# Patient Record
Sex: FEMALE | Race: WHITE | NOT HISPANIC OR LATINO | Employment: FULL TIME | URBAN - METROPOLITAN AREA
[De-identification: names, ages, dates, MRNs, and addresses within clinical notes are randomized per-mention and may not be internally consistent; named-entity substitution may affect disease eponyms.]

---

## 2018-01-10 NOTE — MISCELLANEOUS
Message   Recorded as Task   Date: 04/15/2016 11:46 AM, Created By: Anna Ornelas   Task Name: Follow Up   Assigned To: oLyda Campbell   Regarding Patient: Madhuri Fernandez, Status: Active   Comment:    Anna Ornelas - 15 Apr 2016 11:46 AM     TASK CREATED  Please call patient about follow up appt  L/M FOR PATIENT      Active Problems    1  Abnormal electrocardiogram (794 31) (R94 31)   2  Difficulty breathing (786 09) (R06 89)   3  Hypertension (401 9) (I10)   4  Iron deficiency anemia (280 9) (D50 9)   5  Morbid or severe obesity due to excess calories (278 01) (E66 01)   6  Obesity (278 00) (E66 9)   7  Postgastrectomy malabsorption (579 3) (K91 2,Z90 3)   8  Pre-operative cardiovascular examination (V72 81) (Z01 810)   9  Status post gastric bypass for obesity (V45 86) (Z98 84)   10  Varicose Veins Of Lower Extremities (454 9)   11  Vitamin D deficiency (268 9) (E55 9)    Current Meds   1  Biotin 5 MG Oral Capsule; Therapy: (Recorded:99Wvb9968) to Recorded   2  Calcium 500 TABS; Therapy: (Recorded:64Msx9439) to Recorded   3  Daily Multiple Vitamins/Iron Oral Tablet; Therapy: (Recorded:51Dvs8109) to Recorded   4  Omeprazole 20 MG Oral Capsule Delayed Release; TAKE 1 CAPSULE DAILY; Therapy: [id number] to (Evaluate:99Bco0181)  Requested for: 76SZR1778; Last   Rx:[id number] Ordered    Allergies    1   No Known Drug Allergies    Signatures   Electronically signed by : Mell Holden, ; Apr 18 2016  9:38AM EST                       (Author)

## 2018-06-25 ENCOUNTER — TELEPHONE (OUTPATIENT)
Dept: BARIATRICS | Facility: CLINIC | Age: 43
End: 2018-06-25

## 2021-07-08 ENCOUNTER — HOSPITAL ENCOUNTER (INPATIENT)
Facility: HOSPITAL | Age: 46
LOS: 1 days | Discharge: HOME | DRG: 663 | End: 2021-07-09
Attending: EMERGENCY MEDICINE | Admitting: FAMILY MEDICINE
Payer: COMMERCIAL

## 2021-07-08 ENCOUNTER — TELEPHONE (OUTPATIENT)
Dept: FAMILY MEDICINE CLINIC | Facility: CLINIC | Age: 46
End: 2021-07-08

## 2021-07-08 ENCOUNTER — OFFICE VISIT (OUTPATIENT)
Dept: FAMILY MEDICINE CLINIC | Facility: CLINIC | Age: 46
End: 2021-07-08
Payer: COMMERCIAL

## 2021-07-08 ENCOUNTER — APPOINTMENT (OUTPATIENT)
Dept: LAB | Facility: HOSPITAL | Age: 46
DRG: 663 | End: 2021-07-08
Payer: COMMERCIAL

## 2021-07-08 VITALS
SYSTOLIC BLOOD PRESSURE: 133 MMHG | DIASTOLIC BLOOD PRESSURE: 84 MMHG | TEMPERATURE: 97.8 F | HEIGHT: 67 IN | HEART RATE: 96 BPM | WEIGHT: 208 LBS | RESPIRATION RATE: 15 BRPM | OXYGEN SATURATION: 95 % | BODY MASS INDEX: 32.65 KG/M2

## 2021-07-08 DIAGNOSIS — I83.93 VARICOSE VEINS OF BOTH LOWER EXTREMITIES, UNSPECIFIED WHETHER COMPLICATED: ICD-10-CM

## 2021-07-08 DIAGNOSIS — Z11.59 ENCOUNTER FOR HCV SCREENING TEST FOR LOW RISK PATIENT: ICD-10-CM

## 2021-07-08 DIAGNOSIS — D64.9 ANEMIA, UNSPECIFIED TYPE: Primary | ICD-10-CM

## 2021-07-08 DIAGNOSIS — Z12.31 ENCOUNTER FOR SCREENING MAMMOGRAM FOR MALIGNANT NEOPLASM OF BREAST: ICD-10-CM

## 2021-07-08 DIAGNOSIS — Z98.84 HISTORY OF GASTRIC BYPASS: ICD-10-CM

## 2021-07-08 DIAGNOSIS — R53.83 FATIGUE, UNSPECIFIED TYPE: ICD-10-CM

## 2021-07-08 DIAGNOSIS — Z00.00 HEALTHCARE MAINTENANCE: ICD-10-CM

## 2021-07-08 DIAGNOSIS — R53.83 FATIGUE, UNSPECIFIED TYPE: Primary | ICD-10-CM

## 2021-07-08 LAB
ABO GROUP BLD: NORMAL
ABO GROUP BLD: NORMAL
ALBUMIN SERPL BCP-MCNC: 3.5 G/DL (ref 3.5–5)
ALP SERPL-CCNC: 69 U/L (ref 46–116)
ALT SERPL W P-5'-P-CCNC: 16 U/L (ref 12–78)
ANION GAP SERPL CALCULATED.3IONS-SCNC: 7 MMOL/L (ref 4–13)
AST SERPL W P-5'-P-CCNC: 11 U/L (ref 5–45)
BASOPHILS # BLD AUTO: 0.04 THOUSANDS/ΜL (ref 0–0.1)
BASOPHILS # BLD AUTO: 0.04 THOUSANDS/ΜL (ref 0–0.1)
BASOPHILS NFR BLD AUTO: 1 % (ref 0–1)
BASOPHILS NFR BLD AUTO: 1 % (ref 0–1)
BILIRUB SERPL-MCNC: 0.5 MG/DL (ref 0.2–1)
BLD GP AB SCN SERPL QL: NEGATIVE
BUN SERPL-MCNC: 11 MG/DL (ref 5–25)
CALCIUM SERPL-MCNC: 8.6 MG/DL (ref 8.3–10.1)
CHLORIDE SERPL-SCNC: 107 MMOL/L (ref 100–108)
CHOLEST SERPL-MCNC: 121 MG/DL (ref 50–200)
CO2 SERPL-SCNC: 27 MMOL/L (ref 21–32)
CREAT SERPL-MCNC: 0.54 MG/DL (ref 0.6–1.3)
EOSINOPHIL # BLD AUTO: 0.05 THOUSAND/ΜL (ref 0–0.61)
EOSINOPHIL # BLD AUTO: 0.09 THOUSAND/ΜL (ref 0–0.61)
EOSINOPHIL NFR BLD AUTO: 1 % (ref 0–6)
EOSINOPHIL NFR BLD AUTO: 2 % (ref 0–6)
ERYTHROCYTE [DISTWIDTH] IN BLOOD BY AUTOMATED COUNT: 22.5 % (ref 11.6–15.1)
ERYTHROCYTE [DISTWIDTH] IN BLOOD BY AUTOMATED COUNT: 22.5 % (ref 11.6–15.1)
FERRITIN SERPL-MCNC: <1 NG/ML (ref 8–388)
FOLATE SERPL-MCNC: >20 NG/ML (ref 3.1–17.5)
GFR SERPL CREATININE-BSD FRML MDRD: 113 ML/MIN/1.73SQ M
GLUCOSE SERPL-MCNC: 100 MG/DL (ref 65–140)
HCG SERPL QL: NEGATIVE
HCT VFR BLD AUTO: 19.7 % (ref 34.8–46.1)
HCT VFR BLD AUTO: 20.2 % (ref 34.8–46.1)
HCV AB SER QL: NORMAL
HDLC SERPL-MCNC: 55 MG/DL
HGB BLD-MCNC: 4.4 G/DL (ref 11.5–15.4)
HGB BLD-MCNC: 4.6 G/DL (ref 11.5–15.4)
IMM GRANULOCYTES # BLD AUTO: 0.01 THOUSAND/UL (ref 0–0.2)
IMM GRANULOCYTES # BLD AUTO: 0.02 THOUSAND/UL (ref 0–0.2)
IMM GRANULOCYTES NFR BLD AUTO: 0 % (ref 0–2)
IMM GRANULOCYTES NFR BLD AUTO: 0 % (ref 0–2)
IRON SATN MFR SERPL: 2 %
IRON SERPL-MCNC: 9 UG/DL (ref 50–170)
LDLC SERPL CALC-MCNC: 59 MG/DL (ref 0–100)
LYMPHOCYTES # BLD AUTO: 1.11 THOUSANDS/ΜL (ref 0.6–4.47)
LYMPHOCYTES # BLD AUTO: 1.32 THOUSANDS/ΜL (ref 0.6–4.47)
LYMPHOCYTES NFR BLD AUTO: 24 % (ref 14–44)
LYMPHOCYTES NFR BLD AUTO: 32 % (ref 14–44)
MCH RBC QN AUTO: 12.8 PG (ref 26.8–34.3)
MCH RBC QN AUTO: 12.9 PG (ref 26.8–34.3)
MCHC RBC AUTO-ENTMCNC: 22.3 G/DL (ref 31.4–37.4)
MCHC RBC AUTO-ENTMCNC: 22.8 G/DL (ref 31.4–37.4)
MCV RBC AUTO: 57 FL (ref 82–98)
MCV RBC AUTO: 57 FL (ref 82–98)
MONOCYTES # BLD AUTO: 0.35 THOUSAND/ΜL (ref 0.17–1.22)
MONOCYTES # BLD AUTO: 0.39 THOUSAND/ΜL (ref 0.17–1.22)
MONOCYTES NFR BLD AUTO: 8 % (ref 4–12)
MONOCYTES NFR BLD AUTO: 9 % (ref 4–12)
NEUTROPHILS # BLD AUTO: 2.33 THOUSANDS/ΜL (ref 1.85–7.62)
NEUTROPHILS # BLD AUTO: 3.07 THOUSANDS/ΜL (ref 1.85–7.62)
NEUTS SEG NFR BLD AUTO: 56 % (ref 43–75)
NEUTS SEG NFR BLD AUTO: 66 % (ref 43–75)
NRBC BLD AUTO-RTO: 0 /100 WBCS
NRBC BLD AUTO-RTO: 0 /100 WBCS
PLATELET # BLD AUTO: 181 THOUSANDS/UL (ref 149–390)
PLATELET # BLD AUTO: 209 THOUSANDS/UL (ref 149–390)
POTASSIUM SERPL-SCNC: 4.5 MMOL/L (ref 3.5–5.3)
PROT SERPL-MCNC: 6.8 G/DL (ref 6.4–8.2)
RBC # BLD AUTO: 3.45 MILLION/UL (ref 3.81–5.12)
RBC # BLD AUTO: 3.56 MILLION/UL (ref 3.81–5.12)
RH BLD: POSITIVE
RH BLD: POSITIVE
SODIUM SERPL-SCNC: 141 MMOL/L (ref 136–145)
SPECIMEN EXPIRATION DATE: NORMAL
TIBC SERPL-MCNC: 496 UG/DL (ref 250–450)
TRIGL SERPL-MCNC: 36 MG/DL
TSH SERPL DL<=0.05 MIU/L-ACNC: 1.15 UIU/ML (ref 0.36–3.74)
VIT B12 SERPL-MCNC: 733 PG/ML (ref 100–900)
WBC # BLD AUTO: 4.14 THOUSAND/UL (ref 4.31–10.16)
WBC # BLD AUTO: 4.68 THOUSAND/UL (ref 4.31–10.16)

## 2021-07-08 PROCEDURE — 86900 BLOOD TYPING SEROLOGIC ABO: CPT | Performed by: EMERGENCY MEDICINE

## 2021-07-08 PROCEDURE — P9016 RBC LEUKOCYTES REDUCED: HCPCS

## 2021-07-08 PROCEDURE — 82746 ASSAY OF FOLIC ACID SERUM: CPT

## 2021-07-08 PROCEDURE — 86803 HEPATITIS C AB TEST: CPT

## 2021-07-08 PROCEDURE — 85025 COMPLETE CBC W/AUTO DIFF WBC: CPT

## 2021-07-08 PROCEDURE — 86901 BLOOD TYPING SEROLOGIC RH(D): CPT | Performed by: EMERGENCY MEDICINE

## 2021-07-08 PROCEDURE — 80061 LIPID PANEL: CPT

## 2021-07-08 PROCEDURE — 86920 COMPATIBILITY TEST SPIN: CPT

## 2021-07-08 PROCEDURE — 82607 VITAMIN B-12: CPT

## 2021-07-08 PROCEDURE — 80053 COMPREHEN METABOLIC PANEL: CPT

## 2021-07-08 PROCEDURE — 83550 IRON BINDING TEST: CPT

## 2021-07-08 PROCEDURE — 83036 HEMOGLOBIN GLYCOSYLATED A1C: CPT

## 2021-07-08 PROCEDURE — 85025 COMPLETE CBC W/AUTO DIFF WBC: CPT | Performed by: EMERGENCY MEDICINE

## 2021-07-08 PROCEDURE — 36430 TRANSFUSION BLD/BLD COMPNT: CPT

## 2021-07-08 PROCEDURE — 83540 ASSAY OF IRON: CPT

## 2021-07-08 PROCEDURE — 84703 CHORIONIC GONADOTROPIN ASSAY: CPT | Performed by: EMERGENCY MEDICINE

## 2021-07-08 PROCEDURE — 30233N1 TRANSFUSION OF NONAUTOLOGOUS RED BLOOD CELLS INTO PERIPHERAL VEIN, PERCUTANEOUS APPROACH: ICD-10-PCS | Performed by: FAMILY MEDICINE

## 2021-07-08 PROCEDURE — 36415 COLL VENOUS BLD VENIPUNCTURE: CPT

## 2021-07-08 PROCEDURE — 84443 ASSAY THYROID STIM HORMONE: CPT

## 2021-07-08 PROCEDURE — 1036F TOBACCO NON-USER: CPT | Performed by: NURSE PRACTITIONER

## 2021-07-08 PROCEDURE — 82728 ASSAY OF FERRITIN: CPT

## 2021-07-08 PROCEDURE — 99223 1ST HOSP IP/OBS HIGH 75: CPT | Performed by: FAMILY MEDICINE

## 2021-07-08 PROCEDURE — 86850 RBC ANTIBODY SCREEN: CPT | Performed by: EMERGENCY MEDICINE

## 2021-07-08 PROCEDURE — 3008F BODY MASS INDEX DOCD: CPT | Performed by: NURSE PRACTITIONER

## 2021-07-08 PROCEDURE — 99204 OFFICE O/P NEW MOD 45 MIN: CPT | Performed by: NURSE PRACTITIONER

## 2021-07-08 PROCEDURE — 99284 EMERGENCY DEPT VISIT MOD MDM: CPT | Performed by: EMERGENCY MEDICINE

## 2021-07-08 PROCEDURE — C9113 INJ PANTOPRAZOLE SODIUM, VIA: HCPCS | Performed by: FAMILY MEDICINE

## 2021-07-08 PROCEDURE — 99284 EMERGENCY DEPT VISIT MOD MDM: CPT

## 2021-07-08 RX ORDER — SODIUM CHLORIDE 9 MG/ML
100 INJECTION, SOLUTION INTRAVENOUS CONTINUOUS
Status: DISCONTINUED | OUTPATIENT
Start: 2021-07-08 | End: 2021-07-09

## 2021-07-08 RX ADMIN — SODIUM CHLORIDE 8 MG/HR: 9 INJECTION, SOLUTION INTRAVENOUS at 23:38

## 2021-07-08 RX ADMIN — SODIUM CHLORIDE 80 MG: 9 INJECTION, SOLUTION INTRAVENOUS at 23:08

## 2021-07-08 NOTE — PROGRESS NOTES
Assessment/Plan:    Fatigue  To obtain lab work now  Discussed concern about recurrence of anemia  Will call with results  To ER for any worsening of symptoms  Varicose veins of both lower extremities    To begin wearing compression stockings daily  Also discussed staying well hydrated in the avoidance of excessive sodium to help reduce swelling  Diagnoses and all orders for this visit:    Fatigue, unspecified type  -     CBC and differential; Future  -     Comprehensive metabolic panel; Future  -     TSH, 3rd generation with Free T4 reflex; Future  -     Iron Panel (Includes Ferritin, Iron Sat%, Iron, and TIBC); Future  -     Vitamin B12; Future  -     Folate; Future    BMI 33 0-33 9,adult  -     Lipid Panel with Direct LDL reflex; Future  -     Hemoglobin A1C; Future    Varicose veins of both lower extremities, unspecified whether complicated  -     Compression Stocking    Healthcare maintenance  -     Ambulatory referral to Gynecology; Future    History of gastric bypass  -     Vitamin B12; Future  -     Folate; Future    Encounter for screening mammogram for malignant neoplasm of breast  -     Mammo screening bilateral w 3d & cad; Future    Encounter for HCV screening test for low risk patient  -     Hepatitis C antibody; Future          Subjective:      Patient ID: Yaz Irizarry is a 50 y o  female  Ramana Andino presents for an initial visit  She was previously connected to another local practice but is transferring to be closer to home  Ramana Andino has a past medical history of severe anemia, and kidney stones  She has a past surgical history of gastric bypass in 2016,  cholecystectomy, hernia repair, and   In 2018, Ramana Andino was admitted to the hospital due to a critically low hemoglobin  She was given a blood transfusion  She has not had any additional lab work or healthcare since this time    She presents reporting fatigue and shortness of breath on exertion that have been progressively worsening for the past year  She has also been craving ice, having dizziness, and an elevated heart rate  Denies chest pain, or blood in stools  She has an upcoming appointment for colonoscopy scheduled  The following portions of the patient's history were reviewed and updated as appropriate:   She   Patient Active Problem List    Diagnosis Date Noted    Fatigue 07/08/2021    BMI 33 0-33 9,adult 07/08/2021    Varicose veins of both lower extremities 07/08/2021     No current outpatient medications on file  No current facility-administered medications for this visit  She is allergic to amoxicillin       Review of Systems   Constitutional: Positive for fatigue  HENT: Negative  Respiratory: Positive for shortness of breath (on exertion )  Cardiovascular: Positive for leg swelling  Negative for chest pain and palpitations  Elevated heart rate   Gastrointestinal:        Craving ice   Genitourinary:        LMP:7/2/21   Musculoskeletal: Negative  Skin: Negative  Neurological: Positive for dizziness  Psychiatric/Behavioral: Positive for sleep disturbance (chronic )  /84   Pulse 96   Temp 97 8 °F (36 6 °C)   Resp 15   Ht 5' 6 5" (1 689 m)   Wt 94 3 kg (208 lb)   LMP 07/02/2021   SpO2 95%   BMI 33 07 kg/m²     Objective:     Physical Exam  Vitals and nursing note reviewed  Constitutional:       General: She is not in acute distress  Appearance: She is well-developed  She is not ill-appearing, toxic-appearing or diaphoretic  Comments: Pale appearing   HENT:      Head: Normocephalic and atraumatic  Right Ear: Hearing, tympanic membrane and external ear normal       Left Ear: Hearing, tympanic membrane and external ear normal       Nose: Nose normal       Mouth/Throat:      Pharynx: Oropharynx is clear  Uvula midline  Eyes:      General: Lids are normal  Vision grossly intact  Gaze aligned appropriately        Extraocular Movements: Extraocular movements intact  Pupils: Pupils are equal, round, and reactive to light  Comments:  Presence of conjunctival pallor   Neck:      Thyroid: No thyromegaly  Cardiovascular:      Rate and Rhythm: Normal rate and regular rhythm  Heart sounds: Normal heart sounds  No murmur heard  Comments:  Presence of varicose veins bilaterally  Pulmonary:      Effort: Pulmonary effort is normal  No respiratory distress  Breath sounds: Normal breath sounds  No wheezing or rales  Chest:      Chest wall: No tenderness  Abdominal:      General: Bowel sounds are normal  There is no distension  Palpations: Abdomen is soft  There is no mass  Tenderness: There is no abdominal tenderness  There is no guarding or rebound  Musculoskeletal:         General: No tenderness or deformity  Normal range of motion  Cervical back: Normal range of motion and neck supple  Right lower leg: Edema (Trace) present  Left lower leg: Edema (Trace) present  Lymphadenopathy:      Cervical: No cervical adenopathy  Skin:     General: Skin is warm and dry  Coloration: Skin is not pale  Findings: No erythema or rash  Neurological:      General: No focal deficit present  Mental Status: She is alert and oriented to person, place, and time  Cranial Nerves: No cranial nerve deficit  Psychiatric:         Mood and Affect: Mood normal          Behavior: Behavior normal          Thought Content: Thought content normal          Judgment: Judgment normal              BMI Counseling: Body mass index is 33 07 kg/m²  The BMI is above normal  Nutrition recommendations include decreasing overall calorie intake, 3-5 servings of fruits/vegetables daily, reducing fast food intake, consuming healthier snacks, decreasing soda and/or juice intake, moderation in carbohydrate intake and increasing intake of lean protein  Exercise recommendations include exercising 3-5 times per week

## 2021-07-08 NOTE — H&P
[address] [facility]  H&P- Harish Cast 1975, 50 y o  female MRN: [id number]  Unit/Bed#: -01 Encounter: [id number]  Primary Care Provider: NOHEMY Cisse   Date and time admitted to hospital: 7/8/2021  4:50 PM    * Symptomatic anemia  Assessment & Plan  47-year-old lady with known history of gastric bypass surgery in 2013 presents with symptoms of increasing fatigue/exertional dyspnea/palpitations for almost 1 year  Patient reports feeling unwell in general   She did report in 2018 having similar symptoms at which point she received blood transfusion  No black tarry stools/bright red blood mixed with stools  She does report history of heavy vaginal bleeding during her periods although they are regular  No hematuria  No other active bleeding  She last saw a doctor in 2018  Denies any prior history of EGD/colonoscopy  · Today hemoglobin 4 6  No prior hemoglobin to compare with to get a baseline  · Hemodynamically stable with a blood pressure of 132/70/heart rate 79  · Stool occult blood x3 ordered  · Requested GI evaluation  · Patient receiving 3 unit packed RBC transfusion in total   · H&H Q 6 hours  · Started IV Protonix drip  · IV fluids  · Iron panel sent  (Patient does report a prior known history of iron deficiency anemia)    H/O gastric bypass  Assessment & Plan  · Not on any chronic multivitamins    Fatigue  Assessment & Plan  · Likely secondary to significant anemia  · TSH normal at 1 132  VTE Prophylaxis: Pharmacologic VTE Prophylaxis contraindicated due to gi  bleed  / sequential compression device   Code Status: level 1  POLST: POLST form is not discussed and not completed at this time  Anticipated Length of Stay:  Patient will be admitted on an Inpatient basis with an anticipated length of stay of  Over than 2 midnights     Justification for Hospital Stay: needs blood transfusion, GI bleed    Total Time for Visit, including Counseling / Coordination of Care: 45 minutes  Greater than 50% of this total time spent on direct patient counseling and coordination of care  Chief Complaint:   fatigue    History of Present Illness:    Dakota Vincent is a 50 y o  female with known history of gastric bypass surgery in  presents with symptoms of increased fatigue/exertional dyspnea/palpitations for almost 1 year  Patient reports feeling unwell in general   Reportedly patient had similar symptoms in 2018 when she did receive blood transfusion but did not follow-up with any doctor following that  Patient denies black tarry stools/bright red blood mixed with stools  She does report history of heavy vaginal bleeding during her periods although they are regular  No hematuria  No other active bleeding  No history of EGD/colonoscopy  Patient's hemoglobin 4 6 today  No prior hemoglobin to compare  Hemodynamically stable  Currently receiving 3 unit packed RBC transfusion  Being admitted for evaluation of severe symptomatic anemia and GI consult  Review of Systems:    Review of Systems   Constitutional: Negative for chills and fever  HENT: Negative for ear pain and sore throat  Eyes: Negative for pain and visual disturbance  Respiratory: Positive for shortness of breath  Negative for cough  Cardiovascular: Negative for chest pain and palpitations  Gastrointestinal: Negative for abdominal pain, blood in stool and vomiting  Genitourinary: Negative for dysuria and hematuria  Musculoskeletal: Negative for arthralgias and back pain  Skin: Negative for color change and rash  Neurological: Positive for weakness  Negative for seizures and syncope  All other systems reviewed and are negative        Past Medical and Surgical History:     Past Medical History:   Diagnosis Date    Anemia     Kidney stone        Past Surgical History:   Procedure Laterality Date     SECTION      CHOLECYSTECTOMY      GASTRIC BYPASS      HERNIA REPAIR Meds/Allergies:    Prior to Admission medications    Not on File     I have reviewed home medications with patient personally  Allergies: Allergies   Allergen Reactions    Amoxicillin GI Intolerance       Social History:     Marital Status: Single     Substance Use History:   Social History     Substance and Sexual Activity   Alcohol Use Never     Social History     Tobacco Use   Smoking Status Never Smoker   Smokeless Tobacco Never Used     Social History     Substance and Sexual Activity   Drug Use Never       Family History:    Family History   Problem Relation Age of Onset    Stroke Mother     Lung cancer Mother     Aortic aneurysm Mother     Hyperlipidemia Mother     Emphysema Mother     Heart disease Father     Hypertension Father     Diabetes Father     No Known Problems Brother     No Known Problems Daughter     Asthma Maternal Grandmother     Pancreatic cancer Maternal Grandfather     Diabetes Paternal Grandmother     Dementia Paternal Grandmother     Hypertension Paternal Grandmother     Coronary artery disease Paternal Grandfather     No Known Problems Brother        Physical Exam:     Vitals:   Blood Pressure: 125/83 (07/08/21 1941)  Pulse: 86 (07/08/21 1941)  Temperature: 98 3 °F (36 8 °C) (07/08/21 1941)  Temp Source: Oral (07/08/21 1915)  Respirations: 18 (07/08/21 1941)  Height: 5' 6" (167 6 cm) (07/08/21 1652)  Weight - Scale: 94 3 kg (208 lb) (07/08/21 1652)  SpO2: 99 % (07/08/21 1941)    Physical Exam  Vitals reviewed  Constitutional:       General: She is not in acute distress  Appearance: She is normal weight  She is ill-appearing  HENT:      Head: Normocephalic and atraumatic  Nose: Nose normal  No congestion  Mouth/Throat:      Mouth: Mucous membranes are dry  Pharynx: Oropharynx is clear  Eyes:      Pupils: Pupils are equal, round, and reactive to light        Comments: Pallor +   Cardiovascular:      Rate and Rhythm: Normal rate and regular rhythm  Pulses: Normal pulses  Pulmonary:      Effort: Pulmonary effort is normal  No respiratory distress  Breath sounds: Normal breath sounds  No wheezing or rales  Abdominal:      General: Abdomen is flat  Bowel sounds are normal  There is no distension  Palpations: Abdomen is soft  Tenderness: There is no abdominal tenderness  There is no guarding  Musculoskeletal:         General: No swelling  Normal range of motion  Cervical back: Normal range of motion and neck supple  Skin:     General: Skin is warm and dry  Findings: No rash  Neurological:      General: No focal deficit present  Mental Status: She is alert and oriented to person, place, and time  Psychiatric:         Mood and Affect: Mood normal          Behavior: Behavior normal          Additional Data:     Lab Results: I have personally reviewed pertinent reports  Results from last 7 days   Lab Units 07/08/21  1724   WBC Thousand/uL 4 68   HEMOGLOBIN g/dL 4 6*   HEMATOCRIT % 20 2*   PLATELETS Thousands/uL 209   NEUTROS PCT % 66   LYMPHS PCT % 24   MONOS PCT % 8   EOS PCT % 1     Results from last 7 days   Lab Units 07/08/21  1231   SODIUM mmol/L 141   POTASSIUM mmol/L 4 5   CHLORIDE mmol/L 107   CO2 mmol/L 27   BUN mg/dL 11   CREATININE mg/dL 0 54*   ANION GAP mmol/L 7   CALCIUM mg/dL 8 6   ALBUMIN g/dL 3 5   TOTAL BILIRUBIN mg/dL 0 50   ALK PHOS U/L 69   ALT U/L 16   AST U/L 11   GLUCOSE RANDOM mg/dL 100                       Imaging: I have personally reviewed pertinent reports  No orders to display       AllscriObjectVideo / Epic Records Reviewed: No     ** Please Note: This note has been constructed using a voice recognition system   **

## 2021-07-08 NOTE — TELEPHONE ENCOUNTER
T/c to patient again at [phone]  Reviewed critically low hemoglobin with patient  Advised patient to go to the ER now, stressed importance  Patient verbalized agreement and understanding of plan of care

## 2021-07-08 NOTE — ASSESSMENT & PLAN NOTE
28-year-old lady with known history of gastric bypass surgery in 2013 presents with symptoms of increasing fatigue/exertional dyspnea/palpitations for almost 1 year  Patient reports feeling unwell in general   She did report in 2018 having similar symptoms at which point she received blood transfusion  No black tarry stools/bright red blood mixed with stools  She does report history of heavy vaginal bleeding during her periods although they are regular  No hematuria  No other active bleeding  She last saw a doctor in 2018  Denies any prior history of EGD/colonoscopy  · Today hemoglobin 4 6  No prior hemoglobin to compare with to get a baseline  · Hemodynamically stable with a blood pressure of 132/70/heart rate 79  · Stool occult blood x3 ordered  · Requested GI evaluation  · Patient receiving 3 unit packed RBC transfusion in total   · H&H Q 6 hours  · Started IV Protonix drip  · IV fluids  · Iron panel sent    (Patient does report a prior known history of iron deficiency anemia)

## 2021-07-08 NOTE — PATIENT INSTRUCTIONS
Weight Management   AMBULATORY CARE:   Why it is important to manage your weight:  Being overweight increases your risk of health conditions such as heart disease, high blood pressure, type 2 diabetes, and certain types of cancer  It can also increase your risk for osteoarthritis, sleep apnea, and other respiratory problems  Aim for a slow, steady weight loss  Even a small amount of weight loss can lower your risk of health problems  How to lose weight safely:  A safe and healthy way to lose weight is to eat fewer calories and get regular exercise  · You can lose up about 1 pound a week by decreasing the number of calories you eat by 500 calories each day  You can decrease calories by eating smaller portion sizes or by cutting out high-calorie foods  Read labels to find out how many calories are in the foods you eat  · You can also burn calories with exercise such as walking, swimming, or biking  You will be more likely to keep weight off if you make these changes part of your lifestyle  Exercise at least 30 minutes per day on most days of the week  You can also fit in more physical activity by taking the stairs instead of the elevator or parking farther away from stores  Ask your healthcare provider about the best exercise plan for you  Healthy meal plan for weight management:  A healthy meal plan includes a variety of foods, contains fewer calories, and helps you stay healthy  A healthy meal plan includes the following:     · Eat whole-grain foods more often  A healthy meal plan should contain fiber  Fiber is the part of grains, fruits, and vegetables that is not broken down by your body  Whole-grain foods are healthy and provide extra fiber in your diet  Some examples of whole-grain foods are whole-wheat breads and pastas, oatmeal, brown rice, and bulgur  · Eat a variety of vegetables every day  Include dark, leafy greens such as spinach, kale, giovanna greens, and mustard greens   Eat yellow and orange vegetables such as carrots, sweet potatoes, and winter squash  · Eat a variety of fruits every day  Choose fresh or canned fruit (canned in its own juice or light syrup) instead of juice  Fruit juice has very little or no fiber  · Eat low-fat dairy foods  Drink fat-free (skim) milk or 1% milk  Eat fat-free yogurt and low-fat cottage cheese  Try low-fat cheeses such as mozzarella and other reduced-fat cheeses  · Choose meat and other protein foods that are low in fat  Choose beans or other legumes such as split peas or lentils  Choose fish, skinless poultry (chicken or turkey), or lean cuts of red meat (beef or pork)  Before you cook meat or poultry, cut off any visible fat  · Use less fat and oil  Try baking foods instead of frying them  Add less fat, such as margarine, sour cream, regular salad dressing and mayonnaise to foods  Eat fewer high-fat foods  Some examples of high-fat foods include french fries, doughnuts, ice cream, and cakes  · Eat fewer sweets  Limit foods and drinks that are high in sugar  This includes candy, cookies, regular soda, and sweetened drinks  Ways to decrease calories:   · Eat smaller portions  ? Use a small plate with smaller servings  ? Do not eat second helpings  ? When you eat at a restaurant, ask for a box and place half of your meal in the box before you eat  ? Share an entrée with someone else  · Replace high-calorie snacks with healthy, low-calorie snacks  ? Choose fresh fruit, vegetables, fat-free rice cakes, or air-popped popcorn instead of potato chips, nuts, or chocolate  ? Choose water or calorie-free drinks instead of soda or sweetened drinks  · Do not shop for groceries when you are hungry  You may be more likely to make unhealthy food choices  Take a grocery list of healthy foods and shop after you have eaten  · Eat regular meals  Do not skip meals  Skipping meals can lead to overeating later in the day   This can make it harder for you to lose weight  Eat a healthy snack in place of a meal if you do not have time to eat a regular meal  Talk with a dietitian to help you create a meal plan and schedule that is right for you  Other things to consider as you try to lose weight:   · Be aware of situations that may give you the urge to overeat, such as eating while watching television  Find ways to avoid these situations  For example, read a book, go for a walk, or do crafts  · Meet with a weight loss support group or friends who are also trying to lose weight  This may help you stay motivated to continue working on your weight loss goals  © Copyright [address] Information is for End User's use only and may not be sold, redistributed or otherwise used for commercial purposes  All illustrations and images included in CareNotes® are the copyrighted property of A D A M , Inc  or [facility] Lamin Tucker   The above information is an  only  It is not intended as medical advice for individual conditions or treatments  Talk to your doctor, nurse or pharmacist before following any medical regimen to see if it is safe and effective for you  Heart Healthy Diet   AMBULATORY CARE:   A heart healthy diet  is an eating plan low in unhealthy fats and sodium (salt)  The plan is high in healthy fats and fiber  A heart healthy diet helps improve your cholesterol levels and lowers your risk for heart disease and stroke  A dietitian will teach you how to read and understand food labels  Heart healthy diet guidelines to follow:   · Choose foods that contain healthy fats  ? Unsaturated fats  include monounsaturated and polyunsaturated fats  Unsaturated fat is found in foods such as soybean, canola, olive, corn, and safflower oils  It is also found in soft tub margarine that is made with liquid vegetable oil  ? Omega-3 fat  is found in certain fish, such as salmon, tuna, and trout, and in walnuts and flaxseed  Eat fish high in omega-3 fats at least 2 times a week  · Get 20 to 30 grams of fiber each day  Fruits, vegetables, whole-grain foods, and legumes (cooked beans) are good sources of fiber  · Limit or do not have unhealthy fats  ? Cholesterol  is found in animal foods, such as eggs and lobster, and in dairy products made from whole milk  Limit cholesterol to less than 200 mg each day  ? Saturated fat  is found in meats, such as armijo and hamburger  It is also found in chicken or turkey skin, whole milk, and butter  Limit saturated fat to less than 7% of your total daily calories  ? Trans fat  is found in packaged foods, such as potato chips and cookies  It is also in hard margarine, some fried foods, and shortening  Do not eat foods that contain trans fats  · Limit sodium as directed  You may be told to limit sodium to 2,000 to 2,300 mg each day  Choose low-sodium or no-salt-added foods  Add little or no salt to food you prepare  Use herbs and spices in place of salt  Include the following in your heart healthy plan:  Ask your dietitian or healthcare provider how many servings to have from each of the following food groups:  · Grains:      ? Whole-wheat breads, cereals, and pastas, and brown rice    ? Low-fat, low-sodium crackers and chips    · Vegetables:      ? Broccoli, green beans, green peas, and spinach    ? Collards, kale, and lima beans    ? Carrots, sweet potatoes, tomatoes, and peppers    ? Canned vegetables with no salt added    · Fruits:      ? Bananas, peaches, pears, and pineapple    ? Grapes, raisins, and dates    ? Oranges, tangerines, grapefruit, orange juice, and grapefruit juice    ? Apricots, mangoes, melons, and papaya    ? Raspberries and strawberries    ? Canned fruit with no added sugar    · Low-fat dairy:      ? Nonfat (skim) milk, 1% milk, and low-fat almond, cashew, or soy milks fortified with calcium    ?  Low-fat cheese, regular or frozen yogurt, and cottage cheese    · Meats and proteins:      ? Lean cuts of beef and pork (loin, leg, round), skinless chicken and turkey    ? Legumes, soy products, egg whites, or nuts    Limit or do not include the following in your heart healthy plan:   · Unhealthy fats and oils:      ? Whole or 2% milk, cream cheese, sour cream, or cheese    ? High-fat cuts of beef (T-bone steaks, ribs), chicken or turkey with skin, and organ meats such as liver    ? Butter, stick margarine, shortening, and cooking oils such as coconut or palm oil    · Foods and liquids high in sodium:      ? Packaged foods, such as frozen dinners, cookies, macaroni and cheese, and cereals with more than 300 mg of sodium per serving    ? Vegetables with added sodium, such as instant potatoes, vegetables with added sauces, or regular canned vegetables    ? Cured or smoked meats, such as hot dogs, armijo, and sausage    ? High-sodium ketchup, barbecue sauce, salad dressing, pickles, olives, soy sauce, or miso    · Foods and liquids high in sugar:      ? Candy, cake, cookies, pies, or doughnuts    ? Soft drinks (soda), sports drinks, or sweetened tea    ? Canned or dry mixes for cakes, soups, sauces, or gravies    Other healthy heart guidelines:   · Do not smoke  Nicotine and other chemicals in cigarettes and cigars can cause lung and heart damage  Ask your healthcare provider for information if you currently smoke and need help to quit  E-cigarettes or smokeless tobacco still contain nicotine  Talk to your healthcare provider before you use these products  · Limit or do not drink alcohol as directed  Alcohol can damage your heart and raise your blood pressure  Your healthcare provider may give you specific daily and weekly limits  The general recommended limit is 1 drink a day for women 21 or older and for men 72 or older  Do not have more than 3 drinks in a day or 7 in a week  The recommended limit is 2 drinks a day for men 24to 59years of age   Do not have more than 4 drinks in a day or 14 in a week  A drink of alcohol is 12 ounces of beer, 5 ounces of wine, or 1½ ounces of liquor  · Exercise regularly  Exercise can help you maintain a healthy weight and improve your blood pressure and cholesterol levels  Regular exercise can also decrease your risk for heart problems  Ask your healthcare provider about the best exercise plan for you  Do not start an exercise program without asking your healthcare provider  Follow up with your doctor or cardiologist as directed:  Write down your questions so you remember to ask them during your visits  © Copyright [facility] [address] Information is for End User's use only and may not be sold, redistributed or otherwise used for commercial purposes  All illustrations and images included in CareNotes® are the copyrighted property of [employer]  or [facility] Lamin Tucker   The above information is an  only  It is not intended as medical advice for individual conditions or treatments  Talk to your doctor, nurse or pharmacist before following any medical regimen to see if it is safe and effective for you

## 2021-07-08 NOTE — TELEPHONE ENCOUNTER
T/c to patient at [phone], number no longer accepts calls  T/c to her emergency contact, her mother Benito  Gave me an additional number at [phone]  L/M stressing the importance of returning out call due to abnormal findings  Awaiting call back

## 2021-07-08 NOTE — ASSESSMENT & PLAN NOTE
To begin wearing compression stockings daily  Also discussed staying well hydrated in the avoidance of excessive sodium to help reduce swelling

## 2021-07-08 NOTE — ASSESSMENT & PLAN NOTE
To obtain lab work now  Discussed concern about recurrence of anemia  Will call with results  To ER for any worsening of symptoms

## 2021-07-08 NOTE — ED PROVIDER NOTES
Pt Name: Leetta Apley  MRN: [id number]  [place] 1975  Age/Sex: 50 y o  female  Date of evaluation: 2021  PCP: Joceline Mckeon, [address] [place] [address]    Chief Complaint   Patient presents with    Abnormal Lab     Patient states that her HGB today was 4 4         HPI    50 y o  female presenting with symptomatic anemia  Patient states she has a history of anemia, has required blood transfusions in the past but has not seen her family doctor in 2 years, established a new family doctor recently  Had blood work done today and was told her hemoglobin was 4 4  She states she has been having fatigue, shortness of breath, lightheadedness  Denies any pain, denies any blood in her stool, denies any hematemesis  Does states her periods are heavier  Just finished her menstrual period today            Past Medical and Surgical History    Past Medical History:   Diagnosis Date    Anemia     Kidney stone        Past Surgical History:   Procedure Laterality Date     SECTION      CHOLECYSTECTOMY      GASTRIC BYPASS      HERNIA REPAIR         Family History   Problem Relation Age of Onset    Stroke Mother     Lung cancer Mother     Aortic aneurysm Mother     Hyperlipidemia Mother     Emphysema Mother     Heart disease Father     Hypertension Father     Diabetes Father     No Known Problems Brother     No Known Problems Daughter     Asthma Maternal Grandmother     Pancreatic cancer Maternal Grandfather     Diabetes Paternal Grandmother     Dementia Paternal Grandmother     Hypertension Paternal Grandmother     Coronary artery disease Paternal Grandfather     No Known Problems Brother        Social History     Tobacco Use    Smoking status: Never Smoker    Smokeless tobacco: Never Used   Vaping Use    Vaping Use: Never used   Substance Use Topics    Alcohol use: Never    Drug use: Never           Allergies    Allergies   Allergen Reactions    Amoxicillin GI Intolerance       Home Medications    Prior to Admission medications    Not on File           Review of Systems    Review of Systems   Constitutional: Positive for fatigue  Negative for chills and fever  HENT: Negative for rhinorrhea and sore throat  Eyes: Negative for pain and visual disturbance  Respiratory: Positive for shortness of breath  Negative for cough  Cardiovascular: Negative for chest pain and leg swelling  Gastrointestinal: Negative for abdominal pain, nausea and vomiting  Genitourinary: Negative for dysuria and hematuria  Musculoskeletal: Negative for back pain and myalgias  Skin: Positive for pallor  Negative for rash and wound  Neurological: Positive for light-headedness  Negative for syncope and headaches  Physical Exam      ED Triage Vitals   Temperature Pulse Respirations Blood Pressure SpO2   07/08/21 1652 07/08/21 1652 07/08/21 1652 07/08/21 1652 07/08/21 1652   97 8 °F (36 6 °C) (!) 107 22 148/72 100 %      Temp Source Heart Rate Source Patient Position - Orthostatic VS BP Location FiO2 (%)   07/08/21 1652 07/08/21 1652 07/08/21 1652 07/08/21 1652 --   Oral Monitor Lying Right arm       Pain Score       07/08/21 2210       No Pain               Physical Exam  Constitutional:       General: She is not in acute distress  Appearance: She is not toxic-appearing  HENT:      Head: Normocephalic and atraumatic  Nose: Nose normal       Mouth/Throat:      Mouth: Mucous membranes are moist    Eyes:      Extraocular Movements: Extraocular movements intact  Pupils: Pupils are equal, round, and reactive to light  Comments: Conjunctival pallor   Cardiovascular:      Rate and Rhythm: Regular rhythm  Tachycardia present  Pulmonary:      Effort: No respiratory distress  Breath sounds: Normal breath sounds  No wheezing  Abdominal:      General: Abdomen is flat  There is no distension  Tenderness: There is no abdominal tenderness     Musculoskeletal:         General: No swelling or deformity  Normal range of motion  Cervical back: Normal range of motion and neck supple  Skin:     General: Skin is warm  Coloration: Skin is pale  Findings: No erythema  Neurological:      Mental Status: She is alert and oriented to person, place, and time  Mental status is at baseline  Diagnostic Results      Labs:    Results Reviewed     Procedure Component Value Units Date/Time    hCG, qualitative pregnancy [[id number]]  (Normal) Collected: 07/08/21 1724    Lab Status: Final result Specimen: Blood from Arm, Right Updated: 07/08/21 1750     Preg, Serum Negative    CBC and differential [[id number]]  (Abnormal) Collected: 07/08/21 1724    Lab Status: Final result Specimen: Blood from Arm, Right Updated: 07/08/21 1748     WBC 4 68 Thousand/uL      RBC 3 56 Million/uL      Hemoglobin 4 6 g/dL      Hematocrit 20 2 %      MCV 57 fL      MCH 12 9 pg      MCHC 22 8 g/dL      RDW 22 5 %      Platelets 931 Thousands/uL      nRBC 0 /100 WBCs      Neutrophils Relative 66 %      Immat GRANS % 0 %      Lymphocytes Relative 24 %      Monocytes Relative 8 %      Eosinophils Relative 1 %      Basophils Relative 1 %      Neutrophils Absolute 3 07 Thousands/µL      Immature Grans Absolute 0 02 Thousand/uL      Lymphocytes Absolute 1 11 Thousands/µL      Monocytes Absolute 0 39 Thousand/µL      Eosinophils Absolute 0 05 Thousand/µL      Basophils Absolute 0 04 Thousands/µL           All labs reviewed and utilized in the medical decision making process    Radiology:    No orders to display       All radiology studies independently viewed by me and interpreted by the radiologist     Procedure    Procedures        MDM    Patient presenting with significant anemia, tachycardic and symptomatic as described above  Likely not acute anemia, suspicious for chronic anemia, possible iron deficiency anemia  Not taking vitamins/supplementals in the setting of gastric bypass    Offered rectal examination for Hemoccult testing, however patient refused  She denies any GI bleeding  After consent obtained for blood transfusion, 3 units of PRBCs were ordered, discussed with internal medicine for hospitalization  Medications   sodium chloride 0 9 % infusion (100 mL/hr Intravenous New Bag 7/9/21 0528)   pantoprazole (PROTONIX) 80 mg in sodium chloride 0 9 % 100 mL IVPB (0 mg Intravenous Stopped 7/9/21 0559)     And   pantoprazole (PROTONIX) 80 mg in sodium chloride 0 9 % 100 mL infusion (8 mg/hr Intravenous New Bag 7/9/21 0945)   iron sucrose (VENOFER) 200 mg in sodium chloride 0 9 % 100 mL IVPB (200 mg Intravenous New Bag 7/9/21 0945)           FINAL IMPRESSION    Final diagnoses:   Anemia, unspecified type         DISPOSITION    Time reflects when diagnosis was documented in both MDM as applicable and the Disposition within this note     Time User Action Codes Description Comment    7/8/2021  7:06 PM Tera Lane Add [D64 9] Anemia, unspecified type     7/8/2021  7:17 PM Mitra Warren Add [R53 83] Fatigue, unspecified type       ED Disposition     ED Disposition Condition Date/Time Comment    Admit Stable Thu Jul 8, 2021  7:06 PM Case was discussed with Dr Jose Donnelly and the patient's admission status was agreed to be Admission Status: inpatient status to the service of Dr Jose Donnelly   Follow-up Information     Follow up With Specialties Details Why Contact Info    Maryam Larsen, [facility], Nurse Practitioner Schedule an appointment as soon as possible for a visit in 1 week(s)  [address] [place] [address]  [phone]              PATIENT REFERRED TO:    Maryam Larsen, [address]  [address] [place] [address]  [phone]    Schedule an appointment as soon as possible for a visit in 1 week(s)        DISCHARGE MEDICATIONS:    There are no discharge medications for this patient  No discharge procedures on file           Miguel A Litten, DO        This note was partially completed using voice recognition technology, and was scanned for gross errors; however some errors may still exist  Please contact the author with any questions or requests for clarification        Ellen Bird DO  07/09/21 0227

## 2021-07-09 VITALS
HEIGHT: 66 IN | OXYGEN SATURATION: 100 % | TEMPERATURE: 98.2 F | WEIGHT: 208 LBS | SYSTOLIC BLOOD PRESSURE: 140 MMHG | DIASTOLIC BLOOD PRESSURE: 83 MMHG | RESPIRATION RATE: 17 BRPM | HEART RATE: 73 BPM | BODY MASS INDEX: 33.43 KG/M2

## 2021-07-09 LAB
ABO GROUP BLD BPU: NORMAL
ANION GAP SERPL CALCULATED.3IONS-SCNC: 10 MMOL/L (ref 4–13)
BPU ID: NORMAL
BUN SERPL-MCNC: 7 MG/DL (ref 5–25)
CALCIUM SERPL-MCNC: 8.8 MG/DL (ref 8.3–10.1)
CHLORIDE SERPL-SCNC: 106 MMOL/L (ref 100–108)
CO2 SERPL-SCNC: 24 MMOL/L (ref 21–32)
CREAT SERPL-MCNC: 0.58 MG/DL (ref 0.6–1.3)
CROSSMATCH: NORMAL
ERYTHROCYTE [DISTWIDTH] IN BLOOD BY AUTOMATED COUNT: 31.2 % (ref 11.6–15.1)
FERRITIN SERPL-MCNC: 2 NG/ML (ref 8–388)
GFR SERPL CREATININE-BSD FRML MDRD: 110 ML/MIN/1.73SQ M
GLUCOSE SERPL-MCNC: 93 MG/DL (ref 65–140)
HCT VFR BLD AUTO: 27.5 % (ref 34.8–46.1)
HEMOCCULT STL QL: NEGATIVE
HEMOCCULT STL QL: NORMAL
HEMOCCULT STL QL: NORMAL
HGB BLD-MCNC: 7.2 G/DL (ref 11.5–15.4)
IRON SATN MFR SERPL: 5 %
IRON SERPL-MCNC: 25 UG/DL (ref 50–170)
MCH RBC QN AUTO: 17 PG (ref 26.8–34.3)
MCHC RBC AUTO-ENTMCNC: 26.2 G/DL (ref 31.4–37.4)
MCV RBC AUTO: 65 FL (ref 82–98)
PLATELET # BLD AUTO: 181 THOUSANDS/UL (ref 149–390)
POTASSIUM SERPL-SCNC: 4 MMOL/L (ref 3.5–5.3)
RBC # BLD AUTO: 4.23 MILLION/UL (ref 3.81–5.12)
SODIUM SERPL-SCNC: 140 MMOL/L (ref 136–145)
TIBC SERPL-MCNC: 461 UG/DL (ref 250–450)
UNIT DISPENSE STATUS: NORMAL
UNIT PRODUCT CODE: NORMAL
UNIT RH: NORMAL
WBC # BLD AUTO: 5.94 THOUSAND/UL (ref 4.31–10.16)

## 2021-07-09 PROCEDURE — 83550 IRON BINDING TEST: CPT | Performed by: INTERNAL MEDICINE

## 2021-07-09 PROCEDURE — 83540 ASSAY OF IRON: CPT | Performed by: INTERNAL MEDICINE

## 2021-07-09 PROCEDURE — C9113 INJ PANTOPRAZOLE SODIUM, VIA: HCPCS | Performed by: FAMILY MEDICINE

## 2021-07-09 PROCEDURE — 99222 1ST HOSP IP/OBS MODERATE 55: CPT | Performed by: PHYSICIAN ASSISTANT

## 2021-07-09 PROCEDURE — 99232 SBSQ HOSP IP/OBS MODERATE 35: CPT | Performed by: INTERNAL MEDICINE

## 2021-07-09 PROCEDURE — 80048 BASIC METABOLIC PNL TOTAL CA: CPT | Performed by: INTERNAL MEDICINE

## 2021-07-09 PROCEDURE — P9016 RBC LEUKOCYTES REDUCED: HCPCS

## 2021-07-09 PROCEDURE — 82272 OCCULT BLD FECES 1-3 TESTS: CPT | Performed by: FAMILY MEDICINE

## 2021-07-09 PROCEDURE — 82728 ASSAY OF FERRITIN: CPT | Performed by: INTERNAL MEDICINE

## 2021-07-09 PROCEDURE — 85027 COMPLETE CBC AUTOMATED: CPT | Performed by: INTERNAL MEDICINE

## 2021-07-09 RX ORDER — QUINIDINE GLUCONATE 324 MG
240 TABLET, EXTENDED RELEASE ORAL
Qty: 90 TABLET | Refills: 0 | Status: SHIPPED | OUTPATIENT
Start: 2021-07-09 | End: 2021-08-09 | Stop reason: SDUPTHER

## 2021-07-09 RX ADMIN — IRON SUCROSE 200 MG: 20 INJECTION, SOLUTION INTRAVENOUS at 09:45

## 2021-07-09 RX ADMIN — SODIUM CHLORIDE 100 ML/HR: 0.9 INJECTION, SOLUTION INTRAVENOUS at 05:28

## 2021-07-09 RX ADMIN — SODIUM CHLORIDE 8 MG/HR: 9 INJECTION, SOLUTION INTRAVENOUS at 09:45

## 2021-07-09 NOTE — UTILIZATION REVIEW
Inpatient Admission Authorization Request   NOTIFICATION OF INPATIENT ADMISSION/INPATIENT AUTHORIZATION REQUEST   SERVICING FACILITY:   [address] [place] [address]  Tax ID: [id number]  NPI: [id number]  Place of Service: Inpatient [facility]  [address]  Place of Service Code: 24     ATTENDING PROVIDER:  Attending Name and NPI#: Teresa Gamez [[id number]]  Address: [address] [place] [address]  Phone: [phone]     UTILIZATION REVIEW CONTACT:  Sana Jeffers, Utilization   Network Utilization Review Department  Phone: [phone]  Fax [phone]  Email: [email or web address]     PHYSICIAN ADVISORY SERVICES:  FOR [facility] REVIEW - MEDICAL NECESSITY DENIAL  Phone: [phone]  Fax: [phone]  Email: [email or web address]     TYPE OF REQUEST:  Inpatient Status     ADMISSION INFORMATION:  ADMISSION DATE/TIME: 7/8/21  7:07 PM  PATIENT DIAGNOSIS CODE/DESCRIPTION:  Abnormal laboratory test result [R89 9]  Fatigue, unspecified type [R53 83]  Anemia, unspecified type [D64 9]  DISCHARGE DATE/TIME: No discharge date for patient encounter  DISCHARGE DISPOSITION (IF DISCHARGED): Final discharge disposition not confirmed     IMPORTANT INFORMATION:  Please contact the Sana Jeffers directly with any questions or concerns regarding this request  Department voicemails are confidential     Send requests for admission clinical reviews, concurrent reviews, approvals, and administrative denials due to lack of clinical to fax [phone]

## 2021-07-09 NOTE — ASSESSMENT & PLAN NOTE
78-year-old lady with known history of gastric bypass surgery in 2013 presents with symptoms of increasing fatigue/exertional dyspnea/palpitations for almost 1 year  · Today hemoglobin 4 6    No prior hemoglobin to compare with to get a baseline  · Secondary to severe iron deficiency anemia  · Given low MCV suspect very slow obscure bleeding  · Hemodynamically stable  · Received 3 units packed red blood cells  · Follow-up hemoglobin pending  · Will place on IV event for  · Follow-up GI consult for possible EGD  · Continue NPO for now

## 2021-07-09 NOTE — PROGRESS NOTES
[facility]  Progress Note Bart Francis 1975, 50 y o  female MRN: [id number]  Unit/Bed#: -01 Encounter: [id number]  Primary Care Provider: NOHEMY Byers   Date and time admitted to hospital: 2021  4:50 PM    H/O gastric bypass  Assessment & Plan  · Not on any chronic multivitamins    Fatigue  Assessment & Plan  · Likely secondary to significant anemia      * Symptomatic anemia  Assessment & Plan  53-year-old lady with known history of gastric bypass surgery in  presents with symptoms of increasing fatigue/exertional dyspnea/palpitations for almost 1 year  · Today hemoglobin 4 6  No prior hemoglobin to compare with to get a baseline  · Secondary to severe iron deficiency anemia  · Given low MCV suspect very slow obscure bleeding  · Hemodynamically stable  · Received 3 units packed red blood cells  · Follow-up hemoglobin pending  · Will place on IV event for  · Follow-up GI consult for possible EGD  · Continue NPO for now      VTE Pharmacologic Prophylaxis:   Pharmacologic: Pharmacologic VTE Prophylaxis contraindicated due to Acute anemia  Mechanical VTE Prophylaxis in Place: Yes    Patient Centered Rounds: I have performed bedside rounds with nursing staff today  Discussions with Specialists or Other Care Team Provider:  Cm, nursing    Education and Discussions with Family / Patient: pt    Time Spent for Care: 30 minutes  More than 50% of total time spent on counseling and coordination of care as described above      Current Length of Stay: 1 day(s)    Current Patient Status: Inpatient   Certification Statement: The patient will continue to require additional inpatient hospital stay due to Anemia requiring further evaluation    Discharge Plan:  See above    Code Status: Level 1 - Full Code      Subjective:   No Acute complaints    Objective:     Vitals:   Temp (24hrs), Av 4 °F (36 9 °C), Min:97 8 °F (36 6 °C), Max:99 °F (37 2 °C)    Temp:  [97 8 °F (36 6 °C)-99 °F (37 2 °C)] 98 2 °F (36 8 °C)  HR:  [] 73  Resp:  [14-22] 17  BP: (125-148)/(63-84) 140/83  SpO2:  [95 %-100 %] 100 %  Body mass index is 33 57 kg/m²  Input and Output Summary (last 24 hours): Intake/Output Summary (Last 24 hours) at 7/9/2021 0901  Last data filed at 7/9/2021 0800  Gross per 24 hour   Intake 1050 ml   Output --   Net 1050 ml       Physical Exam:     Physical Exam  Constitutional:       General: She is not in acute distress  Appearance: She is well-developed  She is not diaphoretic  HENT:      Head: Normocephalic and atraumatic  Nose: Nose normal       Mouth/Throat:      Pharynx: No oropharyngeal exudate  Eyes:      General: No scleral icterus  Right eye: No discharge  Left eye: No discharge  Conjunctiva/sclera: Conjunctivae normal    Neck:      Thyroid: No thyromegaly  Vascular: No JVD  Cardiovascular:      Rate and Rhythm: Normal rate and regular rhythm  Heart sounds: Normal heart sounds  No murmur heard  No friction rub  No gallop  Pulmonary:      Effort: Pulmonary effort is normal  No respiratory distress  Breath sounds: Normal breath sounds  No wheezing or rales  Chest:      Chest wall: No tenderness  Abdominal:      General: Bowel sounds are normal  There is no distension  Palpations: Abdomen is soft  Tenderness: There is no abdominal tenderness  There is no guarding or rebound  Musculoskeletal:         General: No tenderness or deformity  Normal range of motion  Cervical back: Normal range of motion and neck supple  Skin:     General: Skin is warm and dry  Findings: No erythema or rash  Neurological:      Mental Status: She is alert  Mental status is at baseline  Cranial Nerves: No cranial nerve deficit  Sensory: No sensory deficit  Motor: No abnormal muscle tone        Coordination: Coordination normal        (  Additional Data:     Labs:    Results from last 7 days Lab Units 07/08/21  1724   WBC Thousand/uL 4 68   HEMOGLOBIN g/dL 4 6*   HEMATOCRIT % 20 2*   PLATELETS Thousands/uL 209   NEUTROS PCT % 66   LYMPHS PCT % 24   MONOS PCT % 8   EOS PCT % 1     Results from last 7 days   Lab Units 07/08/21  1231   SODIUM mmol/L 141   POTASSIUM mmol/L 4 5   CHLORIDE mmol/L 107   CO2 mmol/L 27   BUN mg/dL 11   CREATININE mg/dL 0 54*   ANION GAP mmol/L 7   CALCIUM mg/dL 8 6   ALBUMIN g/dL 3 5   TOTAL BILIRUBIN mg/dL 0 50   ALK PHOS U/L 69   ALT U/L 16   AST U/L 11   GLUCOSE RANDOM mg/dL 100                           * I Have Reviewed All Lab Data Listed Above  * Additional Pertinent Lab Tests Reviewed: All Labs Within Last 24 Hours Reviewed    Imaging:    Imaging Reports Reviewed Today Include: na  Imaging Personally Reviewed by Myself Includes:  na    Recent Cultures (last 7 days):           Last 24 Hours Medication List:   Current Facility-Administered Medications   Medication Dose Route Frequency Provider Last Rate    iron sucrose  200 mg Intravenous Daily Jf Ruiz MD      pantoprozole (PROTONIX) infusion (Continuous)  8 mg/hr Intravenous Continuous Trisha Chilel MD 8 mg/hr (07/08/21 5728)    sodium chloride  100 mL/hr Intravenous Continuous Trisha Chilel  mL/hr (07/09/21 6933)        Today, Patient Was Seen By: Alyssia Mayers MD    ** Please Note: Dictation voice to text software may have been used in the creation of this document   **

## 2021-07-09 NOTE — CASE MANAGEMENT
LOS: 1  PT IS NOT A BUNDLE OR 30 DAY RA  PT'S UNPLANNED RA SCORE IS GREEN- 6     CM met with pt at bedside  Pt lives in [place] [place] with her daughter  Pt lives in ranch that has ranch to enter  Pt denies dme and completes adl's independently  Pt denies hx of rehab, hhc, mh, and sa  Pt uses [facility] and pcp is Johann Rizo  Pt's daughter, Karo Carrero or mother, Kendal Brooks are hcr  Pt works as  and drives  CM reviewed discharge planning process including the following: identifying help at home, patient preference for discharge planning needs, pharmacy preference, and availability of treatment team to discuss questions or concerns patient and/or family may have regarding understanding medications and recognizing signs and symptoms once discharged  CM also encouraged patient to follow up with all recommended appointments after discharge  Patient advised of importance for patient and family to participate in managing patients medical well being  Pt was ipta and plans to return home via dgt transport  Pt reports she will update daughter and declined CM contact  Pt has no needs anticipated  CM Dept will follow pt through dc

## 2021-07-09 NOTE — PLAN OF CARE
Problem: Potential for Falls  Goal: Patient will remain free of falls  Description: INTERVENTIONS:  - Educate patient/family on patient safety including physical limitations  - Instruct patient to call for assistance with activity   - Consult OT/PT to assist with strengthening/mobility   - Keep Call bell within reach  - Keep bed low and locked with side rails adjusted as appropriate  - Keep care items and personal belongings within reach  - Initiate and maintain comfort rounds  - Make Fall Risk Sign visible to staff  - Offer Toileting every 2 Hours, in advance of need  - Initiate/Maintain bed alarm  - Obtain necessary fall risk management equipment:   - Apply yellow socks and bracelet for high fall risk patients  - Consider moving patient to room near nurses station  Outcome: Progressing

## 2021-07-09 NOTE — UTILIZATION REVIEW
Initial Clinical Review    Admission: Date/Time/Statement:   Admission Orders (From admission, onward)     Ordered        07/08/21 1907  Inpatient Admission  Once                   Orders Placed This Encounter   Procedures    Inpatient Admission     Standing Status:   Standing     Number of Occurrences:   1     Order Specific Question:   Level of Care     Answer:   Med Surg [16]     Order Specific Question:   Estimated length of stay     Answer:   More than 2 Midnights     Order Specific Question:   Certification     Answer:   I certify that inpatient services are medically necessary for this patient for a duration of greater than two midnights  See H&P and MD Progress Notes for additional information about the patient's course of treatment  ED Arrival Information     Expected Arrival Acuity    - 7/8/2021 16:44 Emergent         Means of arrival Escorted by Service Admission type    [facility] Member General Medicine Emergency         Arrival complaint    abnormal labs          Initial Presentation:  51 yo female presented to M/S floor as inpatient admission for Hgb 4 4  Patient c/o increased fatigue/exertional dyspnea/palpitations outpatient blood worked showed Hgb 4 4 History of gastric bypass surgery and heavy vaginal bleeding with her periods  Patient denies any hematochezia, melena, hematemesis  On exam patient ill appearing dry MM pale   Plan T&C x 3 units PRBc Consult GI and supportive care  GI :  Patient is heme-negative  -Patient will require endoscopic evaluation to include EGD and colonoscopy  Patient wishes to be discharged home to follow up in the outpatient setting for these procedures        Date: 07-09-21   Day 2: d/c to home     ED Triage Vitals   Temperature Pulse Respirations Blood Pressure SpO2   07/08/21 1652 07/08/21 1652 07/08/21 1652 07/08/21 1652 07/08/21 1652   97 8 °F (36 6 °C) (!) 107 22 148/72 100 %      Temp Source Heart Rate Source Patient Position - Orthostatic VS BP Location FiO2 (%)   07/08/21 1652 07/08/21 1652 07/08/21 1652 07/08/21 1652 --   Oral Monitor Lying Right arm       Pain Score       07/08/21 2210       No Pain          Wt Readings from Last 1 Encounters:   07/08/21 94 3 kg (208 lb)     Additional Vital Signs:   07/09/21 01:56:03  98 4 °F (36 9 °C)  75  16  128/75  93  98 %  None (Room air)  Lying   07/09/21 01:00:41  98 5 °F (36 9 °C)  81  16  128/81  97  97 %  None (Room air)  Lying   07/08/21 23:03:40  98 5 °F (36 9 °C)  78  16  132/78  96  98 %  None (Room air)  Lying   07/08/21 2242  99 °F (37 2 °C)  84  16  130/80  --  98 %  None (Room air)  Lying   07/08/21 20:56:36  98 9 °F (37 2 °C)  85  19  139/72  94  100 %  --  --   07/08/21 19:41:08  98 3 °F (36 8 °C)  86  18  125/83  97  99 %  --  --   07/08/21 1915  98 6 °F (37 °C)  84  20  135/77  101  100 %  None (Room air)  Lying   07/08/21 1900  --  81  18  132/79  100  100 %  None (Room air)  Lying   07/08/21 1845  98 5 °F (36 9 °C)  77  16  131/74  97  100 %  None (Room air)  Lying   07/08/21 1835  98 5 °F (36 9 °C)  79  18  132/70  --  100 %  None (Room air)  --   07/08/21 1830  98 5 °F (36 9 °C)  76  14  127/69  93  100 %  None (Room air)  Lying   07/08/21 1800  --  76  17  137/70  96  99 %  None (Room air)  Lying   07/08/21 1730  --  77  15  131/63  90  100 %  None (Room air)  Lying       Pertinent Labs/Diagnostic Test Results:       Results from last 7 days   Lab Units 07/09/21  0933 07/08/21  1724 07/08/21  1231   WBC Thousand/uL 5 94 4 68 4 14*   HEMOGLOBIN g/dL 7 2* 4 6* 4 4*   HEMATOCRIT % 27 5* 20 2* 19 7*   PLATELETS Thousands/uL 181 209 181   NEUTROS ABS Thousands/µL  --  3 07 2 33         Results from last 7 days   Lab Units 07/08/21  1231   SODIUM mmol/L 141   POTASSIUM mmol/L 4 5   CHLORIDE mmol/L 107   CO2 mmol/L 27   ANION GAP mmol/L 7   BUN mg/dL 11   CREATININE mg/dL 0 54*   EGFR ml/min/1 73sq m 113   CALCIUM mg/dL 8 6     Results from last 7 days   Lab Units 07/08/21  1231   AST U/L 11   ALT U/L 16 ALK PHOS U/L 69   TOTAL PROTEIN g/dL 6 8   ALBUMIN g/dL 3 5   TOTAL BILIRUBIN mg/dL 0 50         Results from last 7 days   Lab Units 07/08/21  1231   GLUCOSE RANDOM mg/dL 100     Results from last 7 days   Lab Units 07/08/21  1231   TSH 3RD GENERATON uIU/mL 1 152     Results from last 7 days   Lab Units 07/08/21  1231   FERRITIN ng/mL <1*     Results from last 7 days   Lab Units 07/08/21  1231   HEP C AB  Non-reactive       Past Medical History:   Diagnosis Date    Anemia     Kidney stone      Present on Admission:   Fatigue      Admitting Diagnosis: Abnormal laboratory test result [R89 9]  Fatigue, unspecified type [R53 83]  Anemia, unspecified type [D64 9]  Age/Sex: 50 y o  female  Admission Orders:  Scheduled Medications:  iron sucrose, 200 mg, Intravenous, Daily      Continuous IV Infusions:  pantoprozole (PROTONIX) infusion (Continuous), 8 mg/hr, Intravenous, Continuous      PRN Meds:       IP CONSULT TO GASTROENTEROLOGY    Network Utilization Review Department  ATTENTION: Please call with any questions or concerns to [phone] and carefully listen to the prompts so that you are directed to the right person  All voicemails are confidential   Johnathon Polo all requests for admission clinical reviews, approved or denied determinations and any other requests to dedicated fax number below belonging to the campus where the patient is receiving treatment   List of dedicated fax numbers for the Facilities:  [address] DENIALS (Administrative/Medical Necessity) [phone]   [address] (Maternity/NICU/Pediatrics) [phone]   [address] Kane Madsenel Enedina 2044 [id number] [facility] Miriam Guerra [address] [phone]

## 2021-07-09 NOTE — PROGRESS NOTES
Pt discharged home  All discharge instructions given  Aware of followup appointments  Removed both iv heplocks  Removed masimo, placed back on charging station  Pt has all personal belongings  Left floor with rn in no distress

## 2021-07-09 NOTE — NURSING NOTE
3/3 blood transfusions were completed at this time  Patient did not have any transfusion reactions  Vital signs remained stable  Patient has been NPO since midnight  Fluids and Protonix drip are running  All patients labs have been moved to 4 hours post ending of the third transfusion per SLIM  Patient is resting in bed with bed alarm on and has no further complaints or needs at this time

## 2021-07-09 NOTE — CONSULTS
Consultation - [facility]  Carissa Jensen 50 y o  female MRN: [id number]  Unit/Bed#: -01 Encounter: [id number]      Inpatient consult to gastroenterology  Consult performed by: Samia Aldridge PA-C  Consult ordered by: Anne Adkins MD          Reason for Consult / Principal Problem:  Iron deficiency anemia    HPI: Carissa Jensen is a 50y o  year old female who presents with past medical history significant for gastric bypass surgery and heavy menses  Patient presents the [facility] yesterday after being called by her primary care doctor with a low hemoglobin level  Patient reports she recently established with a new primary care physician due to fatigue and weakness  She reports that her primary care doctor sent her for routine laboratories yesterday and 2 hours later called her to report to the nearest emergency department due to the fact that she was significantly anemic  In the emergency department patient's hemoglobin level is 4 6  Patient has received 3 units of packed red blood cells  Patient is heme negative  Patient reports that her gastric bypass surgery was in 2013 and prior to that she was receiving iron infusions  At the present time patient denies any abdominal pain, nausea, vomiting  Patient reports brown stool  Patient denies any hematochezia, melena, hematemesis  Patient reports her mom has a history of iron deficiency anemia as well  Patient has never had endoscopy or colonoscopy in the past   Patient reports heavy menses for most of her life  She reports this is not a new problem  REVIEW OF SYSTEMS:     CONSTITUTIONAL: Denies any fever, chills, or rigors  Good appetite, and no recent weight loss  HEENT: No earache or tinnitus  Denies hearing loss or visual disturbances  CARDIOVASCULAR: No chest pain or palpitations  RESPIRATORY: Denies any cough, hemoptysis, shortness of breath or dyspnea on exertion    GASTROINTESTINAL: As noted in the History of Present Illness  GENITOURINARY: No problems with urination  Denies any hematuria or dysuria  NEUROLOGIC: No dizziness or vertigo, denies headaches  MUSCULOSKELETAL: Denies any muscle or joint pain  SKIN: Denies skin rashes or itching  ENDOCRINE: Denies excessive thirst  Denies intolerance to heat or cold  PSYCHOSOCIAL: Denies depression or anxiety  Denies any recent memory loss  Historical Information   Past Medical History:   Diagnosis Date    Anemia     Kidney stone      Past Surgical History:   Procedure Laterality Date     SECTION      CHOLECYSTECTOMY      GASTRIC BYPASS      HERNIA REPAIR       Social History   Social History     Substance and Sexual Activity   Alcohol Use Never     Social History     Substance and Sexual Activity   Drug Use Never     Social History     Tobacco Use   Smoking Status Never Smoker   Smokeless Tobacco Never Used     Family History   Problem Relation Age of Onset    Stroke Mother     Lung cancer Mother     Aortic aneurysm Mother     Hyperlipidemia Mother     Emphysema Mother     Heart disease Father     Hypertension Father     Diabetes Father     No Known Problems Brother     No Known Problems Daughter     Asthma Maternal Grandmother     Pancreatic cancer Maternal Grandfather     Diabetes Paternal Grandmother     Dementia Paternal Grandmother     Hypertension Paternal Grandmother     Coronary artery disease Paternal Grandfather     No Known Problems Brother        Meds/Allergies     No medications prior to admission       Current Facility-Administered Medications   Medication Dose Route Frequency    iron sucrose (VENOFER) 200 mg in sodium chloride 0 9 % 100 mL IVPB  200 mg Intravenous Daily    pantoprazole (PROTONIX) 80 mg in sodium chloride 0 9 % 100 mL infusion  8 mg/hr Intravenous Continuous    sodium chloride 0 9 % infusion  100 mL/hr Intravenous Continuous       Allergies   Allergen Reactions    Amoxicillin GI Intolerance       Objective   Blood pressure 140/83, pulse 73, temperature 98 2 °F (36 8 °C), resp  rate 17, height 5' 6" (1 676 m), weight 94 3 kg (208 lb), last menstrual period 07/02/2021, SpO2 100 %  Intake/Output Summary (Last 24 hours) at 7/9/2021 0945  Last data filed at 7/9/2021 0800  Gross per 24 hour   Intake 1050 ml   Output --   Net 1050 ml         PHYSICAL EXAM:      General Appearance:   Alert, cooperative, no distress, appears stated age    HEENT:   Normocephalic, atraumatic, anicteric      Neck:  Supple, symmetrical, trachea midline, no adenopathy;    thyroid: no enlargement/tenderness/nodules; no carotid  bruit or JVD    Lungs:   Clear to auscultation bilaterally; no rales, rhonchi or wheezing; respirations unlabored    Heart[de-identified]   S1 and S2 normal; regular rate and rhythm; no murmur, rub, or gallop     Abdomen:   Soft, non-tender, non-distended; normal bowel sounds; no masses, no organomegaly    Genitalia:   Deferred    Rectal:   Deferred    Extremities:  No cyanosis, clubbing or edema    Pulses:  2+ and symmetric all extremities    Skin:  Skin color, texture, turgor normal, no rashes or lesions    Lymph nodes:  No palpable cervical, axillary or inguinal lymphadenopathy        Lab Results:   Admission on 07/08/2021   Component Date Value    WBC 07/08/2021 4 68     RBC 07/08/2021 3 56*    Hemoglobin 07/08/2021 4 6*    Hematocrit 07/08/2021 20 2*    MCV 07/08/2021 57*    MCH 07/08/2021 12 9*    MCHC 07/08/2021 22 8*    RDW 07/08/2021 22 5*    Platelets 70/88/1528 209     nRBC 07/08/2021 0     Neutrophils Relative 07/08/2021 66     Immat GRANS % 07/08/2021 0     Lymphocytes Relative 07/08/2021 24     Monocytes Relative 07/08/2021 8     Eosinophils Relative 07/08/2021 1     Basophils Relative 07/08/2021 1     Neutrophils Absolute 07/08/2021 3 07     Immature Grans Absolute 07/08/2021 0 02     Lymphocytes Absolute 07/08/2021 1 11     Monocytes Absolute 07/08/2021 0 39     Eosinophils Absolute 07/08/2021 0 05     Basophils Absolute 07/08/2021 0 04     ABO Grouping 07/08/2021 A     Rh Factor 07/08/2021 Positive     Antibody Screen 07/08/2021 Negative     Specimen Expiration Date 07/08/2021 [id number]     Preg, Serum 07/08/2021 Negative     Unit Product Code 07/09/2021 P7011S55     Unit Number 07/09/2021 [id number]     Unit ABO 07/09/2021 A     Unit RH 07/09/2021 POS     Crossmatch 07/09/2021 Compatible     Unit Dispense Status 07/09/2021 Presumed Trans     Unit Product Code 07/09/2021 R4460S63     Unit Number 07/09/2021 [id number]     Unit ABO 07/09/2021 A     Unit RH 07/09/2021 POS     Crossmatch 07/09/2021 Compatible     Unit Dispense Status 07/09/2021 Presumed Trans     Unit Product Code 07/09/2021 [id number]     Unit Number 07/09/2021 [id number]     Unit ABO 07/09/2021 A     Unit RH 07/09/2021 POS     Crossmatch 07/09/2021 Compatible     Unit Dispense Status 07/09/2021 Presumed Trans     ABO Grouping 07/08/2021 A     Rh Factor 07/08/2021 Positive     Fecal Occult Blood Diagn* 07/09/2021 Negative     Fecal Occult Blood Diagn* 07/09/2021 Test not performed     Fecal Occult Blood Diagn* 07/09/2021 Test not performed        Imaging Studies: I have personally reviewed pertinent imaging studies  ASSESSMENT & PLAN:  Symptomatic anemia  Iron deficiency anemia  -Patient's hemoglobin level on admission was 4 6  Patient has received 3 units of packed red blood cells  -Patient is heme-negative  -Patient will require endoscopic evaluation to include EGD and colonoscopy  Patient wishes to be discharged home to follow up in the outpatient setting for these procedures   -Would recommend repeating CBC this morning at 9:00 a m   To be sure patient has responded appropriately to blood transfusion   -Message will be sent to the office staff to arrange outpatient follow-up   -I have explained the patient if she has any episodes of GI bleeding to include bright red blood per rectum or melena she needs to return to the nearest emergency department  Patient will be seen examined by Magali Leon PA-C  7/9/2021,9:45 AM

## 2021-07-12 ENCOUNTER — TELEPHONE (OUTPATIENT)
Dept: GASTROENTEROLOGY | Facility: CLINIC | Age: 46
End: 2021-07-12

## 2021-07-12 ENCOUNTER — TRANSITIONAL CARE MANAGEMENT (OUTPATIENT)
Dept: FAMILY MEDICINE CLINIC | Facility: CLINIC | Age: 46
End: 2021-07-12

## 2021-07-12 LAB
EST. AVERAGE GLUCOSE BLD GHB EST-MCNC: 186 MG/DL
HBA1C MFR BLD: 8.1 %

## 2021-07-12 NOTE — TELEPHONE ENCOUNTER
----- Message from Pradip Gomez PA-C sent at 7/9/2021  9:51 AM EDT -----  Hey patient needs outpatient EGD/colonoscopy! Thank you!

## 2021-07-12 NOTE — UTILIZATION REVIEW
Notification of Discharge   This is a Notification of Discharge from our facility [address]  Please be advised that this patient has been discharge from our facility  Below you will find the admission and discharge date and time including the patients disposition  UTILIZATION REVIEW CONTACT:  Tova Bose  Utilization   Network Utilization Review Department  Phone: [phone] x carefully listen to the prompts  All voicemails are confidential   Email: [email or web address]     PHYSICIAN ADVISORY SERVICES:  FOR QSEF-NV-SIGD REVIEW - MEDICAL NECESSITY DENIAL  Phone: [phone]  Fax: [phone]  Email: [email or web address]     PRESENTATION DATE: 7/8/2021  4:50 PM  OBERVATION ADMISSION DATE:   INPATIENT ADMISSION DATE: 7/8/21  7:07 PM   DISCHARGE DATE: 7/9/2021  2:32 PM  DISPOSITION: Home/Self Care Home/Self Care      IMPORTANT INFORMATION:  Send all requests for admission clinical reviews, approved or denied determinations and any other requests to dedicated fax number below belonging to the campus where the patient is receiving treatment   List of dedicated fax numbers:  [address] DENIALS (Administrative/Medical Necessity) [phone]   [address] [place] [address] (Maternity/NICU/Pediatrics) [phone]   [facility] [phone]   [facility] [phone]   [facility] [employer] [phone]   [address] [phone]   [facility]  [phone]   [address] [place] [address]  [address] [facility] [address] [phone]

## 2021-08-02 ENCOUNTER — TELEPHONE (OUTPATIENT)
Dept: FAMILY MEDICINE CLINIC | Facility: CLINIC | Age: 46
End: 2021-08-02

## 2021-08-09 ENCOUNTER — OFFICE VISIT (OUTPATIENT)
Dept: FAMILY MEDICINE CLINIC | Facility: CLINIC | Age: 46
End: 2021-08-09
Payer: COMMERCIAL

## 2021-08-09 VITALS
HEIGHT: 66 IN | OXYGEN SATURATION: 100 % | WEIGHT: 203.8 LBS | DIASTOLIC BLOOD PRESSURE: 86 MMHG | SYSTOLIC BLOOD PRESSURE: 120 MMHG | TEMPERATURE: 98.1 F | BODY MASS INDEX: 32.75 KG/M2 | HEART RATE: 67 BPM

## 2021-08-09 DIAGNOSIS — D64.9 SYMPTOMATIC ANEMIA: Primary | ICD-10-CM

## 2021-08-09 DIAGNOSIS — I83.93 VARICOSE VEINS OF BOTH LOWER EXTREMITIES, UNSPECIFIED WHETHER COMPLICATED: ICD-10-CM

## 2021-08-09 DIAGNOSIS — D64.9 ANEMIA, UNSPECIFIED TYPE: ICD-10-CM

## 2021-08-09 DIAGNOSIS — E11.65 UNCONTROLLED TYPE 2 DIABETES MELLITUS WITH HYPERGLYCEMIA (HCC): ICD-10-CM

## 2021-08-09 DIAGNOSIS — Z98.84 H/O GASTRIC BYPASS: ICD-10-CM

## 2021-08-09 PROBLEM — R53.83 FATIGUE: Status: RESOLVED | Noted: 2021-07-08 | Resolved: 2021-08-09

## 2021-08-09 PROCEDURE — 3008F BODY MASS INDEX DOCD: CPT | Performed by: NURSE PRACTITIONER

## 2021-08-09 PROCEDURE — 99214 OFFICE O/P EST MOD 30 MIN: CPT | Performed by: NURSE PRACTITIONER

## 2021-08-09 RX ORDER — QUINIDINE GLUCONATE 324 MG
240 TABLET, EXTENDED RELEASE ORAL
Qty: 90 TABLET | Refills: 0 | Status: SHIPPED | OUTPATIENT
Start: 2021-08-09 | End: 2021-09-09

## 2021-08-09 NOTE — PROGRESS NOTES
Assessment/Plan:    Uncontrolled type 2 diabetes mellitus with hyperglycemia (HCC)    Lab Results   Component Value Date    HGBA1C 8 1 (H) 07/08/2021     Reviewed recent A1c and goal of 7%  To begin metformin 500 mg every 12 hours  Diabetic foot exam completed today  Patient is up-to-date on her eye exam     Counseled on the importance of low carb diet and daily physical activity  Offered to refer patient to the diabetic educator, patient refused  Follow-up in 3 months or sooner if needed  Varicose veins of both lower extremities    To begin wearing compression stockings as previously advised  Symptomatic anemia    To repeat CBC  To continue iron supplement  Provided referral to a Heme-Onc as patient required iron infusions in the past     Also counseled on foods rich in iron  BMI 33 0-33 9,adult    Counseled on the importance of weight loss and daily physical activity  Diagnoses and all orders for this visit:    Symptomatic anemia  -     CBC and differential; Future  -     Ambulatory referral to Hematology / Oncology; Future    H/O gastric bypass  -     Ambulatory referral to Gastroenterology; Future    Uncontrolled type 2 diabetes mellitus with hyperglycemia (HCC)  -     metFORMIN (GLUCOPHAGE) 500 mg tablet; Take 1 tablet (500 mg total) by mouth 2 (two) times a day with meals  -     Microalbumin / creatinine urine ratio    Varicose veins of both lower extremities, unspecified whether complicated    Anemia, unspecified type  -     ferrous gluconate (FERGON) 240 (27 FE) MG tablet; Take 1 tablet (240 mg total) by mouth 3 (three) times a day with meals    BMI 33 0-33 9,adult          Subjective:      Patient ID: Johnathon Rojo is a 50 y o  female  Radha Rincon presents for a follow-up  She was recently admitted to the hospital due to a critically low hemoglobin of 4 4  She received 3 units of PRBCs  Her last hgb=7 2 on 7/9/21  She has been taking iron as prescribed    She is due for an outpatient EGD and colonoscopy which she has not yet scheduled  Denies shortness of breath, chest pain, or fatigue  The following portions of the patient's history were reviewed and updated as appropriate:   She   Patient Active Problem List    Diagnosis Date Noted    Uncontrolled type 2 diabetes mellitus with hyperglycemia ([facility] [address] ) 08/09/2021    BMI 33 0-33 9,adult 07/08/2021    Varicose veins of both lower extremities 07/08/2021    Symptomatic anemia 07/08/2021    H/O gastric bypass 07/08/2021     Current Outpatient Medications   Medication Sig Dispense Refill    ferrous gluconate (FERGON) 240 (27 FE) MG tablet Take 1 tablet (240 mg total) by mouth 3 (three) times a day with meals 90 tablet 0    metFORMIN (GLUCOPHAGE) 500 mg tablet Take 1 tablet (500 mg total) by mouth 2 (two) times a day with meals 60 tablet 2     No current facility-administered medications for this visit  She is allergic to amoxicillin       Review of Systems   Constitutional: Negative  Respiratory: Negative  Cardiovascular: Negative  Gastrointestinal: Negative  Genitourinary: Negative  Neurological: Negative  Psychiatric/Behavioral: Negative  /86 (BP Location: Left arm, Patient Position: Sitting)   Pulse 67   Temp 98 1 °F (36 7 °C) (Tympanic)   Ht 5' 6" (1 676 m)   Wt 92 4 kg (203 lb 12 8 oz)   SpO2 100%   BMI 32 89 kg/m²     Objective:     Physical Exam  Vitals and nursing note reviewed  Constitutional:       General: She is not in acute distress  Appearance: Normal appearance  She is well-developed  She is not ill-appearing, toxic-appearing or diaphoretic  HENT:      Head: Normocephalic and atraumatic  Eyes:      Conjunctiva/sclera: Conjunctivae normal    Cardiovascular:      Rate and Rhythm: Normal rate and regular rhythm  Pulses: no weak pulses          Dorsalis pedis pulses are 2+ on the right side and 2+ on the left side  Heart sounds: Normal heart sounds   No murmur heard         Comments:   Presence of varicosities in lower extremities  Pulmonary:      Effort: Pulmonary effort is normal  No respiratory distress  Breath sounds: Normal breath sounds  No wheezing or rales  Chest:      Chest wall: No tenderness  Musculoskeletal:      Cervical back: Neck supple  Feet:      Right foot:      Skin integrity: No ulcer, skin breakdown, erythema, warmth, callus or dry skin  Left foot:      Skin integrity: No ulcer, skin breakdown, erythema, warmth, callus or dry skin  Neurological:      General: No focal deficit present  Mental Status: She is alert and oriented to person, place, and time  Psychiatric:         Mood and Affect: Mood normal          Behavior: Behavior normal          Thought Content: Thought content normal          Judgment: Judgment normal            Patient's shoes and socks removed  Right Foot/Ankle   Right Foot Inspection  Skin Exam: skin normal and skin intact no dry skin, no warmth, no callus, no erythema, no maceration, no abnormal color, no pre-ulcer, no ulcer and no callus                          Toe Exam: ROM and strength within normal limits  Sensory       Monofilament testing: intact  Vascular  Capillary refills: < 3 seconds  The right DP pulse is 2+  Left Foot/Ankle  Left Foot Inspection  Skin Exam: skin normal and skin intactno dry skin, no warmth, no erythema, no maceration, normal color, no pre-ulcer, no ulcer and no callus                         Toe Exam: ROM and strength within normal limits                   Sensory       Monofilament: intact  Vascular  Capillary refills: < 3 seconds  The left DP pulse is 2+  Assign Risk Category:  No deformity present; No loss of protective sensation;  No weak pulses       Risk: 0

## 2021-08-09 NOTE — ASSESSMENT & PLAN NOTE
To repeat CBC  To continue iron supplement  Provided referral to a Heme-Onc as patient required iron infusions in the past     Also counseled on foods rich in iron

## 2021-08-09 NOTE — ASSESSMENT & PLAN NOTE
Lab Results   Component Value Date    HGBA1C 8 1 (H) 07/08/2021     Reviewed recent A1c and goal of 7%  To begin metformin 500 mg every 12 hours  Diabetic foot exam completed today  Patient is up-to-date on her eye exam     Counseled on the importance of low carb diet and daily physical activity  Offered to refer patient to the diabetic educator, patient refused  Follow-up in 3 months or sooner if needed

## 2021-08-11 ENCOUNTER — TELEPHONE (OUTPATIENT)
Dept: HEMATOLOGY ONCOLOGY | Facility: CLINIC | Age: 46
End: 2021-08-11

## 2021-08-11 NOTE — TELEPHONE ENCOUNTER
New Patient Request   Patient Details:     Zaid Garcia      1975      [id number]      Reason for Appointment   Who is calling to schedule? Patient    If not Patient, what is their name? What is the diagnosis? Symptomatic anemia   Who is the referring doctor? Shanelle Sweeney   Scheduling Information   Which department are you scheduling with ? Hematology    Preferred [address] [place] [address] Number to call back on? If calling from the [facility], use the Nurse number   [phone]   Miscellaneous Information:     Please advise the patient, a new patient  will be calling them back within 1 business day

## 2021-08-13 ENCOUNTER — TELEPHONE (OUTPATIENT)
Dept: HEMATOLOGY ONCOLOGY | Facility: CLINIC | Age: 46
End: 2021-08-13

## 2021-08-13 NOTE — TELEPHONE ENCOUNTER
New Patient Encounter    New Patient Intake Form   Patient Details:  Carissa Jensen  1975  [id number]    Background Information:  [address] starts by opening a telephone encounter and gathering the following information   Who is calling to schedule? If not self, relationship to patient? Patient   Referring Provider Symptomatic anemia   What is the diagnosis? Symptomatic anemia   Is this Cancer or Non-Cancer? Non-Cancer   Is this diagnosis confirmed? Yes   When was the diagnosis? 08/09/2021   Is there a confirmed diagnosis from a biopsy/tissue reviewed by pathology? NA   Were outside slides requested? NA   Is patient aware of diagnosis? Yes   Is there a personal history and what kind? No   Is there a family history and what kind? Yes Mother - Will Ambrosia   Reason for visit? New Diagnosis   Have you had any imaging or labs done? If so: when, where? yes  07/09/2021 [facility]    [facility]   If patient has a prior history of cancer were old records obtained? NA   Was the patient told to bring a disk? No   Does the patient smoke or Vape? No   If yes, how many packs or cartridges per day? Scheduling Information:   Preferred [place]:  [facility]     Are there any dates/time the patient cannot be seen? Miscellaneous:    After completing the above information, please route to Financial Counselor and the appropriate Nurse Navigator for review          Patient is schduled with Dr Ponce Villavicencio on 09/03 in [facility]

## 2021-08-17 NOTE — TELEPHONE ENCOUNTER
I left voice mail for pt to call to move her appt up  Mallorie Sharpe had offered 8/25 at 11:20  However, this appt is no longer available  Emailed Mallorie Sharpe to see if there is another appt I can offer her

## 2021-08-18 NOTE — TELEPHONE ENCOUNTER
Patient returned my call  She has been scheduled with Salvador Perches tomorrow at the [facility] office  COVID screening reviewed  Notified Isac Vieira via Tripe[employer] and email

## 2021-08-19 ENCOUNTER — APPOINTMENT (OUTPATIENT)
Dept: LAB | Facility: HOSPITAL | Age: 46
End: 2021-08-19
Payer: COMMERCIAL

## 2021-08-19 ENCOUNTER — CONSULT (OUTPATIENT)
Dept: HEMATOLOGY ONCOLOGY | Facility: CLINIC | Age: 46
End: 2021-08-19
Payer: COMMERCIAL

## 2021-08-19 VITALS
WEIGHT: 208 LBS | SYSTOLIC BLOOD PRESSURE: 124 MMHG | DIASTOLIC BLOOD PRESSURE: 80 MMHG | HEIGHT: 66 IN | TEMPERATURE: 98.3 F | HEART RATE: 62 BPM | BODY MASS INDEX: 33.43 KG/M2

## 2021-08-19 DIAGNOSIS — D64.9 SYMPTOMATIC ANEMIA: Primary | ICD-10-CM

## 2021-08-19 DIAGNOSIS — D64.9 SYMPTOMATIC ANEMIA: ICD-10-CM

## 2021-08-19 DIAGNOSIS — Z98.84 H/O GASTRIC BYPASS: ICD-10-CM

## 2021-08-19 DIAGNOSIS — D50.8 OTHER IRON DEFICIENCY ANEMIA: ICD-10-CM

## 2021-08-19 DIAGNOSIS — N92.0 MENORRHAGIA WITH REGULAR CYCLE: ICD-10-CM

## 2021-08-19 LAB
BASOPHILS # BLD AUTO: 0.04 THOUSANDS/ΜL (ref 0–0.1)
BASOPHILS NFR BLD AUTO: 1 % (ref 0–1)
CREAT UR-MCNC: 48.3 MG/DL
EOSINOPHIL # BLD AUTO: 0.08 THOUSAND/ΜL (ref 0–0.61)
EOSINOPHIL NFR BLD AUTO: 2 % (ref 0–6)
ERYTHROCYTE [DISTWIDTH] IN BLOOD BY AUTOMATED COUNT: 24.4 % (ref 11.6–15.1)
HCT VFR BLD AUTO: 34.5 % (ref 34.8–46.1)
HGB BLD-MCNC: 9.3 G/DL (ref 11.5–15.4)
IMM GRANULOCYTES # BLD AUTO: 0.01 THOUSAND/UL (ref 0–0.2)
IMM GRANULOCYTES NFR BLD AUTO: 0 % (ref 0–2)
LYMPHOCYTES # BLD AUTO: 1.33 THOUSANDS/ΜL (ref 0.6–4.47)
LYMPHOCYTES NFR BLD AUTO: 27 % (ref 14–44)
MCH RBC QN AUTO: 19.7 PG (ref 26.8–34.3)
MCHC RBC AUTO-ENTMCNC: 27 G/DL (ref 31.4–37.4)
MCV RBC AUTO: 73 FL (ref 82–98)
MICROALBUMIN UR-MCNC: <5 MG/L (ref 0–20)
MICROALBUMIN/CREAT 24H UR: <10 MG/G CREATININE (ref 0–30)
MONOCYTES # BLD AUTO: 0.3 THOUSAND/ΜL (ref 0.17–1.22)
MONOCYTES NFR BLD AUTO: 6 % (ref 4–12)
NEUTROPHILS # BLD AUTO: 3.16 THOUSANDS/ΜL (ref 1.85–7.62)
NEUTS SEG NFR BLD AUTO: 64 % (ref 43–75)
NRBC BLD AUTO-RTO: 0 /100 WBCS
PLATELET # BLD AUTO: 175 THOUSANDS/UL (ref 149–390)
RBC # BLD AUTO: 4.73 MILLION/UL (ref 3.81–5.12)
WBC # BLD AUTO: 4.92 THOUSAND/UL (ref 4.31–10.16)

## 2021-08-19 PROCEDURE — 85025 COMPLETE CBC W/AUTO DIFF WBC: CPT

## 2021-08-19 PROCEDURE — 36415 COLL VENOUS BLD VENIPUNCTURE: CPT

## 2021-08-19 PROCEDURE — 82570 ASSAY OF URINE CREATININE: CPT | Performed by: NURSE PRACTITIONER

## 2021-08-19 PROCEDURE — 99244 OFF/OP CNSLTJ NEW/EST MOD 40: CPT | Performed by: INTERNAL MEDICINE

## 2021-08-19 PROCEDURE — 3074F SYST BP LT 130 MM HG: CPT | Performed by: INTERNAL MEDICINE

## 2021-08-19 PROCEDURE — 82043 UR ALBUMIN QUANTITATIVE: CPT | Performed by: NURSE PRACTITIONER

## 2021-08-19 PROCEDURE — 3079F DIAST BP 80-89 MM HG: CPT | Performed by: INTERNAL MEDICINE

## 2021-08-19 RX ORDER — SODIUM CHLORIDE 9 MG/ML
20 INJECTION, SOLUTION INTRAVENOUS ONCE
Status: CANCELLED | OUTPATIENT
Start: 2021-08-23

## 2021-08-19 NOTE — PROGRESS NOTES
HEMATOLOGY / ONCOLOGY CONSULTATION NOTE    Primary Care Provider: NOHEMY Matson  Referring Provider: Lalit Carpio  MRN: [id number]  : 1975    Assessment / Plan:   1  Symptomatic anemia  2  Other iron deficiency anemia  Patient with iron deficiency anemia summarized below  Etiologies likely due to gastric bypass plus menorrhagia  Last blood draw 2021 showed hemoglobin 7 2 grams/deciliter, MCV 65, ferritin 2, iron 25, TIBC 461, iron saturation 5%  She is currently taking oral iron  Had a CBC drawn today which is still pending  I will follow her CBC drawn today  If hemoglobin is less than 7 grams/deciliter, she will report back to the ED for another blood transfusion  Given patient is not symptomatic currently and if hemoglobin is stable or greater than 7 we can proceed with IV iron  We will schedule her for Feraheme 510 mg x 2  Follow-up in 3 months with repeat CBC, iron panel  She will make an OBGYN appointment regarding her menorrhagia  She has an appointment with GI 2021 to discuss likely scopes to ensure no GI bleed present     - CBC and differential; Future  - Iron Panel (Includes Ferritin, Iron Sat%, Iron, and TIBC); Future    Patient education provided regarding IV iron  Side effects include but are not limited to the following: hypotension, muscle aches, headache, chills, diarrhea, nausea, lightheadedness, rashes, and very rarely anaphylactic reaction  Patient understands & would still like to proceed with treatment  3  H/O gastric bypass  - as above  Patient understands that given her history of gastric bypass she may require IV iron at least 2 to 3 times a year in full dosages  4  Menorrhagia with regular cycle  - patient will see her OBGYN regarding menorrhagia  Again, she understands that this may increase her need for IV iron per year  Patient voiced understanding and agreement to the above    The patient knows to call the office with any questions or concerns regarding the above  I have spent 60 minutes with Patient  today in which greater than 50% of this time was spent in counseling/coordination of care regarding Diagnostic results, Risks and benefits of tx options, Intructions for management, Patient and family education, Importance of tx compliance, Risk factor reductions and Impressions  Reason for visit:       Chief Complaint   Patient presents with    Consult     symptomatic anemia        History of Hematology / Oncology Illness:     Dee Lassiter is a 50 y o  female who came in for consultation  1  Acute Iron Deficiency Anemia   - presented to hosp 7/2021 with Hgb 4g/dL  S/P 3 units pRBC    - Etiology believed to be from menorrhagia + Gastric Bypass  - Did not have inpatient EGD, colonoscopy in 7/2021  - currently taking oral iron    - 7/9/2021: D/C Hgb 7 2g/dL, MCV 65  Ferritin 2, Iron 25, TIBC 461, Iron sat 5%  2  History of Gastric Bypass (12/2013)  3  Menorrhagia with regular cycle     Interval History:   1819/21: 50year old female with PMH of gastric bypass presents for consultation  Patient presented to the hospital    Patient had shortness of breath with exertion, PICA for ice  She presented to her PCP who bere some lab work  Her hemoglobin came up as 4 4 grams/deciliter  She reported to the ED and was admitted  Status post 3 units packed red blood cell  Discharge hemoglobin was 7 2 grams/deciliter  No scopes were done during hospitalization  Fecal occult blood test was negative  Patient has menstrual cycles lasting 5 days with 2-4 heaviest   With her changing pad and tampon every 2 hours  Also has history of gastric bypass as above  Patient is not a vegan or vegetarian, consumes about 1-2 meals a day  Currently taking oral iron without GI upset  Has had IV iron in the past which was prior to her gastric bypass in tolerated this  Has required a blood transfusion in the past regarding the same problem at [facility]    Was told to follow a hematologist and never did  Denies current symptoms of SOB with exertion, fatigue  RACHELLE still mildly present  Feels more energetic after blood transfusions  Does not smoke or drink  She is a manager at a [employer]  No personal history of cancer  Family history of lung cancer in mom who was a heavy smoker  No other family history of cancer  Mammogram ordered by PCP and to be scheduled  Problem list:       Patient Active Problem List   Diagnosis    BMI 33 0-33 9,adult    Varicose veins of both lower extremities    Symptomatic anemia    H/O gastric bypass    Uncontrolled type 2 diabetes mellitus with hyperglycemia (HCC)       REVIEW OF SYMPTOMS:   Review of Systems   Constitutional: Negative for activity change, appetite change, chills, diaphoresis, fatigue, fever and unexpected weight change  HENT: Negative for mouth sores and nosebleeds  Eyes: Negative for visual disturbance  Respiratory: Negative for apnea, cough, choking, chest tightness and shortness of breath  Cardiovascular: Negative for chest pain, palpitations and leg swelling  Gastrointestinal: Negative for abdominal pain, anal bleeding, blood in stool, constipation, diarrhea, nausea and vomiting  Endocrine: Negative for cold intolerance  Genitourinary: Positive for menstrual problem and vaginal bleeding  Negative for hematuria  Musculoskeletal: Negative for arthralgias  Skin: Negative for color change, pallor and rash  Neurological: Negative for dizziness, syncope, light-headedness and headaches  Hematological: Negative for adenopathy  Does not bruise/bleed easily  Psychiatric/Behavioral: Negative for sleep disturbance  PHYSICAL EXAMINATION:     Vital Signs:   /80 (BP Location: Right arm, Patient Position: Sitting)   Pulse 62   Temp 98 3 °F (36 8 °C) (Tympanic)   Ht 5' 6" (1 676 m)   Wt 94 3 kg (208 lb)   BMI 33 57 kg/m²   Body surface area is 2 03 meters squared     Ht Readings from Last 3 Encounters:   08/19/21 5' 6" (1 676 m)   08/09/21 5' 6" (1 676 m)   07/08/21 5' 6" (1 676 m)       Wt Readings from Last 3 Encounters:   08/19/21 94 3 kg (208 lb)   08/09/21 92 4 kg (203 lb 12 8 oz)   07/08/21 94 3 kg (208 lb)          Physical Exam  Constitutional:       General: She is not in acute distress  Appearance: Normal appearance  She is not ill-appearing, toxic-appearing or diaphoretic  HENT:      Head: Normocephalic and atraumatic  Eyes:      General: No scleral icterus  Extraocular Movements: Extraocular movements intact  Conjunctiva/sclera: Conjunctivae normal       Pupils: Pupils are equal, round, and reactive to light  Cardiovascular:      Rate and Rhythm: Normal rate and regular rhythm  Heart sounds: Normal heart sounds  No murmur heard  Pulmonary:      Effort: Pulmonary effort is normal  No respiratory distress  Breath sounds: Normal breath sounds  No stridor  No wheezing, rhonchi or rales  Abdominal:      Palpations: Abdomen is soft  Tenderness: There is no abdominal tenderness  Musculoskeletal:         General: No tenderness  Normal range of motion  Cervical back: Normal range of motion and neck supple  Right lower leg: No edema  Left lower leg: No edema  Lymphadenopathy:      Cervical: No cervical adenopathy  Skin:     General: Skin is warm and dry  Coloration: Skin is not jaundiced or pale  Findings: No bruising, erythema or rash  Neurological:      General: No focal deficit present  Mental Status: She is alert and oriented to person, place, and time  Mental status is at baseline  Cranial Nerves: No cranial nerve deficit  Motor: No weakness  Psychiatric:         Mood and Affect: Mood normal          Behavior: Behavior normal          Thought Content: Thought content normal          Judgment: Judgment normal            Reviewed historical information          PAST MEDICAL HISTORY:    Past Medical History:   Diagnosis Date    Anemia     Kidney stone        PAST SURGICAL HISTORY:    Past Surgical History:   Procedure Laterality Date     SECTION      CHOLECYSTECTOMY      GASTRIC BYPASS      HERNIA REPAIR           CURRENT MEDICATIONS:     Current Outpatient Medications:     ferrous gluconate (FERGON) 240 (27 FE) MG tablet, Take 1 tablet (240 mg total) by mouth 3 (three) times a day with meals, Disp: 90 tablet, Rfl: 0    metFORMIN (GLUCOPHAGE) 500 mg tablet, Take 1 tablet (500 mg total) by mouth 2 (two) times a day with meals, Disp: 60 tablet, Rfl: 2    SOCIAL HISTORY:    Social History     Tobacco Use    Smoking status: Never Smoker    Smokeless tobacco: Never Used   Vaping Use    Vaping Use: Never used   Substance Use Topics    Alcohol use: Never    Drug use: Never       FAMILY HISTORY:    Family History   Problem Relation Age of Onset    Stroke Mother     Lung cancer Mother     Aortic aneurysm Mother     Hyperlipidemia Mother     Emphysema Mother     Heart disease Father     Hypertension Father     Diabetes Father     No Known Problems Brother     No Known Problems Daughter     Asthma Maternal Grandmother     Pancreatic cancer Maternal Grandfather     Diabetes Paternal Grandmother     Dementia Paternal Grandmother     Hypertension Paternal Grandmother     Coronary artery disease Paternal Grandfather     No Known Problems Brother        ALLERGIES:    Allergies   Allergen Reactions    Amoxicillin GI Intolerance         LAB:    Lab Results   Component Value Date    WBC 4 92 2021    HGB 9 3 (L) 2021    HCT 34 5 (L) 2021    MCV 73 (L) 2021     2021       Lab Results   Component Value Date    SODIUM 140 2021    K 4 0 2021     2021    CO2 24 2021    AGAP 10 2021    BUN 7 2021    CREATININE 0 58 (L) 2021    GLUC 93 2021    CALCIUM 8 8 2021    AST 11 07/08/2021    ALT 16 07/08/2021    ALKPHOS 69 07/08/2021    TP 6 8 07/08/2021    TBILI 0 50 07/08/2021    EGFR 110 07/09/2021       IMAGING:  No image results found

## 2021-08-20 ENCOUNTER — TELEPHONE (OUTPATIENT)
Dept: HEMATOLOGY ONCOLOGY | Facility: CLINIC | Age: 46
End: 2021-08-20

## 2021-08-20 DIAGNOSIS — Z98.84 H/O GASTRIC BYPASS: ICD-10-CM

## 2021-08-20 DIAGNOSIS — D64.9 SYMPTOMATIC ANEMIA: Primary | ICD-10-CM

## 2021-08-20 RX ORDER — SODIUM CHLORIDE 9 MG/ML
20 INJECTION, SOLUTION INTRAVENOUS ONCE
Status: CANCELLED | OUTPATIENT
Start: 2021-08-23

## 2021-08-24 DIAGNOSIS — Z98.84 H/O GASTRIC BYPASS: ICD-10-CM

## 2021-08-24 DIAGNOSIS — D64.9 SYMPTOMATIC ANEMIA: Primary | ICD-10-CM

## 2021-08-24 RX ORDER — SODIUM CHLORIDE 9 MG/ML
20 INJECTION, SOLUTION INTRAVENOUS ONCE
Status: CANCELLED | OUTPATIENT
Start: 2021-08-26

## 2021-08-26 ENCOUNTER — HOSPITAL ENCOUNTER (OUTPATIENT)
Dept: INFUSION CENTER | Facility: CLINIC | Age: 46
Discharge: HOME | End: 2021-08-26
Payer: COMMERCIAL

## 2021-08-26 VITALS
TEMPERATURE: 97.1 F | SYSTOLIC BLOOD PRESSURE: 141 MMHG | DIASTOLIC BLOOD PRESSURE: 64 MMHG | HEART RATE: 67 BPM | RESPIRATION RATE: 18 BRPM

## 2021-08-26 DIAGNOSIS — D64.9 SYMPTOMATIC ANEMIA: Primary | ICD-10-CM

## 2021-08-26 DIAGNOSIS — Z98.84 H/O GASTRIC BYPASS: ICD-10-CM

## 2021-08-26 PROCEDURE — 96365 THER/PROPH/DIAG IV INF INIT: CPT

## 2021-08-26 RX ORDER — SODIUM CHLORIDE 9 MG/ML
20 INJECTION, SOLUTION INTRAVENOUS ONCE
Status: CANCELLED | OUTPATIENT
Start: 2021-09-02

## 2021-08-26 RX ORDER — SODIUM CHLORIDE 9 MG/ML
20 INJECTION, SOLUTION INTRAVENOUS ONCE
Status: COMPLETED | OUTPATIENT
Start: 2021-08-26 | End: 2021-08-26

## 2021-08-26 RX ADMIN — SODIUM CHLORIDE 20 ML/HR: 0.9 INJECTION, SOLUTION INTRAVENOUS at 12:48

## 2021-08-26 RX ADMIN — IRON SUCROSE 300 MG: 20 INJECTION, SOLUTION INTRAVENOUS at 12:50

## 2021-08-26 NOTE — PROGRESS NOTES
Pt tolerated venofer infusion without incident  PIV removed  AVS declined  Pt discharged in stable condition

## 2021-09-01 ENCOUNTER — TELEPHONE (OUTPATIENT)
Dept: GASTROENTEROLOGY | Facility: CLINIC | Age: 46
End: 2021-09-01

## 2021-09-01 ENCOUNTER — OFFICE VISIT (OUTPATIENT)
Dept: GASTROENTEROLOGY | Facility: CLINIC | Age: 46
End: 2021-09-01
Payer: COMMERCIAL

## 2021-09-01 VITALS
DIASTOLIC BLOOD PRESSURE: 80 MMHG | SYSTOLIC BLOOD PRESSURE: 124 MMHG | WEIGHT: 206 LBS | BODY MASS INDEX: 33.11 KG/M2 | HEIGHT: 66 IN | HEART RATE: 82 BPM

## 2021-09-01 DIAGNOSIS — D50.9 IRON DEFICIENCY ANEMIA, UNSPECIFIED IRON DEFICIENCY ANEMIA TYPE: Primary | ICD-10-CM

## 2021-09-01 DIAGNOSIS — Z98.84 H/O GASTRIC BYPASS: ICD-10-CM

## 2021-09-01 PROCEDURE — 99214 OFFICE O/P EST MOD 30 MIN: CPT | Performed by: PHYSICIAN ASSISTANT

## 2021-09-01 NOTE — TELEPHONE ENCOUNTER
----- Message from Hay Hayden PA-C sent at 9/1/2021 11:33 AM EDT -----  Please inform patient I also put in an order for blood work to check for celiac disease which can cause an anemia

## 2021-09-01 NOTE — PROGRESS NOTES
[facility] - Outpatient Consultation  Aura Rogers 50 y o  female MRN: [id number]  Encounter: [id number]          ASSESSMENT AND PLAN:      1  Iron deficiency anemia  2  H/O gastric bypass    Patient presents for an evaluation of an iron deficiency anemia  She was recently admitted in July with severe symptomatic anemia with a HGB of 4 6  She was transfused 3 units of PRBCs  She has a history of gastric bypass surgery performed in 2013  She also reports menorrhagia  No melena or rectal bleeding  She is following with hematology and receiving iron infusions  Her latest HGB is up to 9 3  Will plan for EGD and colonoscopy to investigate for anastomotic ulcers, AVMs, malignancy, etc   Will check celiac serology  Note: her HAJA may be related to malabsorption from her gastric bypass anatomy and menorrhagia  She has an appt with gynecology  Continued monitoring of CBC and iron infusions per hematology  ______________________________________________________________________    HPI:  Patient is a 50year old female who presents to the office for an evaluation of an iron deficiency anemia  She was recently admitted in July with severe symptomatic anemia with a HGB of 4 6  She was transfused 3 units of PRBCs  She has a history of gastric bypass surgery performed in 2013  She also reports chronic menorrhagia  She denies any melena or rectal bleeding  She denies nausea or vomiting  No abdominal pain  She does report a noisy stomach  She denies any unintentional weight loss  She has never had a colonoscopy  She has not had an EGD since before her gastric bypass surgery  She is following with hematology and receiving iron infusions  Her latest HGB is up to 9 3  She denies any family history of esophageal, stomach, or colon cancer  REVIEW OF SYSTEMS:    CONSTITUTIONAL: Denies any fever, chills, rigors, and weight loss  HEENT: No earache or tinnitus   Denies hearing loss or visual disturbances  CARDIOVASCULAR: No chest pain or palpitations  RESPIRATORY: Denies any cough, hemoptysis, shortness of breath or dyspnea on exertion  GASTROINTESTINAL: As noted in the History of Present Illness  GENITOURINARY: No problems with urination  Denies any hematuria or dysuria  NEUROLOGIC: No dizziness or vertigo, denies headaches  MUSCULOSKELETAL: Denies any muscle or joint pain  SKIN: Denies skin rashes or itching  ENDOCRINE: Denies excessive thirst  Denies intolerance to heat or cold  PSYCHOSOCIAL: Denies depression or anxiety  Denies any recent memory loss         Historical Information   Past Medical History:   Diagnosis Date    Anemia     Kidney stone      Past Surgical History:   Procedure Laterality Date     SECTION      CHOLECYSTECTOMY      GASTRIC BYPASS      HERNIA REPAIR       Social History   Social History     Substance and Sexual Activity   Alcohol Use Never     Social History     Substance and Sexual Activity   Drug Use Never     Social History     Tobacco Use   Smoking Status Never Smoker   Smokeless Tobacco Never Used     Family History   Problem Relation Age of Onset    Stroke Mother     Lung cancer Mother     Aortic aneurysm Mother     Hyperlipidemia Mother     Emphysema Mother     Heart disease Father    [facility] Hypertension Father     Diabetes Father     No Known Problems Brother     No Known Problems Daughter     Asthma Maternal Grandmother     Pancreatic cancer Maternal Grandfather     Diabetes Paternal Grandmother     Dementia Paternal Grandmother     Hypertension Paternal Grandmother     Coronary artery disease Paternal Grandfather     No Known Problems Brother        Meds/Allergies       Current Outpatient Medications:     ferrous gluconate (FERGON) 240 (27 FE) MG tablet    metFORMIN (GLUCOPHAGE) 500 mg tablet    Allergies   Allergen Reactions    Amoxicillin GI Intolerance           Objective     Blood pressure 124/80, pulse 82, height 5' 6" (1 676 m), weight 93 4 kg (206 lb)  Body mass index is 33 25 kg/m²  PHYSICAL EXAM:      General Appearance:   Alert, cooperative, no distress   HEENT:   Normocephalic, atraumatic, anicteric      Neck:  Supple, symmetrical, trachea midline   Lungs:   Clear to auscultation bilaterally; no rales, rhonchi or wheezing; respirations unlabored    Heart[de-identified]   Regular rate and rhythm; no murmur, rub, or gallop  Abdomen:   Soft, non-tender, non-distended; normal bowel sounds; no masses, no organomegaly    Genitalia:   Deferred    Rectal:   Deferred    Extremities:  No cyanosis, clubbing or edema    Pulses:  2+ and symmetric    Skin:  No jaundice, rashes, or lesions    Lymph nodes:  No palpable cervical lymphadenopathy        Lab Results:   No visits with results within 1 Day(s) from this visit  Latest known visit with results is:   Appointment on 08/19/2021   Component Date Value    WBC 08/19/2021 4 92     RBC 08/19/2021 4 73     Hemoglobin 08/19/2021 9 3*    Hematocrit 08/19/2021 34 5*    MCV 08/19/2021 73*    MCH 08/19/2021 19 7*    MCHC 08/19/2021 27 0*    RDW 08/19/2021 24 4*    Platelets 16/65/6454 175     nRBC 08/19/2021 0     Neutrophils Relative 08/19/2021 64     Immat GRANS % 08/19/2021 0     Lymphocytes Relative 08/19/2021 27     Monocytes Relative 08/19/2021 6     Eosinophils Relative 08/19/2021 2     Basophils Relative 08/19/2021 1     Neutrophils Absolute 08/19/2021 3 16     Immature Grans Absolute 08/19/2021 0 01     Lymphocytes Absolute 08/19/2021 1 33     Monocytes Absolute 08/19/2021 0 30     Eosinophils Absolute 08/19/2021 0 08     Basophils Absolute 08/19/2021 0 04          Radiology Results:   No results found

## 2021-09-02 ENCOUNTER — HOSPITAL ENCOUNTER (OUTPATIENT)
Dept: INFUSION CENTER | Facility: CLINIC | Age: 46
Discharge: HOME | End: 2021-09-02

## 2021-09-02 VITALS
SYSTOLIC BLOOD PRESSURE: 141 MMHG | HEART RATE: 61 BPM | TEMPERATURE: 96.9 F | DIASTOLIC BLOOD PRESSURE: 73 MMHG | RESPIRATION RATE: 18 BRPM

## 2021-09-02 DIAGNOSIS — Z98.84 H/O GASTRIC BYPASS: ICD-10-CM

## 2021-09-02 DIAGNOSIS — D64.9 SYMPTOMATIC ANEMIA: ICD-10-CM

## 2021-09-02 NOTE — PROGRESS NOTES
Pt to clinic for venofer, offers no complaints, after multiple attempts by multiple nurses unable to place successful IV, pt opted to reschedule, pt aware of next appointment, avs declined

## 2021-09-07 DIAGNOSIS — D64.9 SYMPTOMATIC ANEMIA: Primary | ICD-10-CM

## 2021-09-07 DIAGNOSIS — Z98.84 H/O GASTRIC BYPASS: ICD-10-CM

## 2021-09-07 RX ORDER — SODIUM CHLORIDE 9 MG/ML
20 INJECTION, SOLUTION INTRAVENOUS ONCE
Status: CANCELLED | OUTPATIENT
Start: 2021-09-09

## 2021-09-09 ENCOUNTER — HOSPITAL ENCOUNTER (OUTPATIENT)
Dept: INFUSION CENTER | Facility: CLINIC | Age: 46
Discharge: HOME | End: 2021-09-09
Payer: COMMERCIAL

## 2021-09-09 VITALS
DIASTOLIC BLOOD PRESSURE: 73 MMHG | OXYGEN SATURATION: 100 % | SYSTOLIC BLOOD PRESSURE: 144 MMHG | TEMPERATURE: 97.2 F | RESPIRATION RATE: 18 BRPM | HEART RATE: 64 BPM

## 2021-09-09 DIAGNOSIS — Z98.84 H/O GASTRIC BYPASS: ICD-10-CM

## 2021-09-09 DIAGNOSIS — D64.9 SYMPTOMATIC ANEMIA: ICD-10-CM

## 2021-09-09 DIAGNOSIS — D64.9 ANEMIA, UNSPECIFIED TYPE: ICD-10-CM

## 2021-09-09 DIAGNOSIS — Z98.84 H/O GASTRIC BYPASS: Primary | ICD-10-CM

## 2021-09-09 DIAGNOSIS — D64.9 SYMPTOMATIC ANEMIA: Primary | ICD-10-CM

## 2021-09-09 PROCEDURE — 96365 THER/PROPH/DIAG IV INF INIT: CPT

## 2021-09-09 RX ORDER — DIAPER,BRIEF,INFANT-TODD,DISP
EACH MISCELLANEOUS
Qty: 90 TABLET | Refills: 0 | Status: SHIPPED | OUTPATIENT
Start: 2021-09-09

## 2021-09-09 RX ORDER — SODIUM CHLORIDE 9 MG/ML
20 INJECTION, SOLUTION INTRAVENOUS ONCE
Status: CANCELLED | OUTPATIENT
Start: 2021-09-09

## 2021-09-09 RX ORDER — SODIUM CHLORIDE 9 MG/ML
20 INJECTION, SOLUTION INTRAVENOUS ONCE
Status: COMPLETED | OUTPATIENT
Start: 2021-09-09 | End: 2021-09-09

## 2021-09-09 RX ORDER — SODIUM CHLORIDE 9 MG/ML
20 INJECTION, SOLUTION INTRAVENOUS ONCE
Status: CANCELLED | OUTPATIENT
Start: 2021-09-16

## 2021-09-09 RX ADMIN — SODIUM CHLORIDE 20 ML/HR: 9 INJECTION, SOLUTION INTRAVENOUS at 12:40

## 2021-09-09 RX ADMIN — IRON SUCROSE 300 MG: 20 INJECTION, SOLUTION INTRAVENOUS at 12:40

## 2021-09-09 NOTE — PROGRESS NOTES
Patient presents for Venofer offering no complaints  Clarified with Sagar Millan RN that patient is to receive Venofer instead of Feraheme, as patient's insurance did not cover Feraheme  Patient tolerated treatment without incident  AVS declined  Next appointment reviewed

## 2021-09-16 ENCOUNTER — HOSPITAL ENCOUNTER (OUTPATIENT)
Dept: INFUSION CENTER | Facility: CLINIC | Age: 46
Discharge: HOME | End: 2021-09-16
Payer: COMMERCIAL

## 2021-09-16 VITALS
TEMPERATURE: 96.9 F | DIASTOLIC BLOOD PRESSURE: 69 MMHG | RESPIRATION RATE: 18 BRPM | SYSTOLIC BLOOD PRESSURE: 136 MMHG | OXYGEN SATURATION: 100 % | HEART RATE: 80 BPM

## 2021-09-16 DIAGNOSIS — D64.9 SYMPTOMATIC ANEMIA: ICD-10-CM

## 2021-09-16 DIAGNOSIS — Z98.84 H/O GASTRIC BYPASS: Primary | ICD-10-CM

## 2021-09-16 PROCEDURE — 96365 THER/PROPH/DIAG IV INF INIT: CPT

## 2021-09-16 RX ORDER — SODIUM CHLORIDE 9 MG/ML
20 INJECTION, SOLUTION INTRAVENOUS ONCE
Status: CANCELLED | OUTPATIENT
Start: 2021-09-23

## 2021-09-16 RX ORDER — SODIUM CHLORIDE 9 MG/ML
20 INJECTION, SOLUTION INTRAVENOUS ONCE
Status: COMPLETED | OUTPATIENT
Start: 2021-09-16 | End: 2021-09-16

## 2021-09-16 RX ADMIN — SODIUM CHLORIDE 20 ML/HR: 0.9 INJECTION, SOLUTION INTRAVENOUS at 09:20

## 2021-09-16 RX ADMIN — IRON SUCROSE 300 MG: 20 INJECTION, SOLUTION INTRAVENOUS at 09:44

## 2021-09-16 NOTE — PROGRESS NOTES
Pt here venofer, offering no complaints  Right AC IV placed with positive blood return noted  Tolerated infusion without incident  PIV removed  AVS declined  Walked out in stable condition

## 2021-09-23 ENCOUNTER — HOSPITAL ENCOUNTER (OUTPATIENT)
Dept: INFUSION CENTER | Facility: CLINIC | Age: 46
End: 2021-09-23

## 2021-09-27 ENCOUNTER — TELEPHONE (OUTPATIENT)
Dept: GASTROENTEROLOGY | Facility: HOSPITAL | Age: 46
End: 2021-09-27

## 2021-09-28 ENCOUNTER — ANESTHESIA (OUTPATIENT)
Dept: GASTROENTEROLOGY | Facility: HOSPITAL | Age: 46
End: 2021-09-28

## 2021-09-28 ENCOUNTER — TELEPHONE (OUTPATIENT)
Dept: GASTROENTEROLOGY | Facility: CLINIC | Age: 46
End: 2021-09-28

## 2021-09-28 ENCOUNTER — HOSPITAL ENCOUNTER (OUTPATIENT)
Dept: GASTROENTEROLOGY | Facility: HOSPITAL | Age: 46
Setting detail: OUTPATIENT SURGERY
Discharge: HOME | End: 2021-09-28
Attending: INTERNAL MEDICINE | Admitting: INTERNAL MEDICINE
Payer: COMMERCIAL

## 2021-09-28 ENCOUNTER — ANESTHESIA EVENT (OUTPATIENT)
Dept: GASTROENTEROLOGY | Facility: HOSPITAL | Age: 46
End: 2021-09-28

## 2021-09-28 VITALS
RESPIRATION RATE: 21 BRPM | HEIGHT: 66 IN | BODY MASS INDEX: 32.1 KG/M2 | WEIGHT: 199.74 LBS | SYSTOLIC BLOOD PRESSURE: 137 MMHG | HEART RATE: 51 BPM | DIASTOLIC BLOOD PRESSURE: 71 MMHG | OXYGEN SATURATION: 99 % | TEMPERATURE: 97 F

## 2021-09-28 DIAGNOSIS — D50.9 IRON DEFICIENCY ANEMIA, UNSPECIFIED IRON DEFICIENCY ANEMIA TYPE: ICD-10-CM

## 2021-09-28 DIAGNOSIS — Z98.84 H/O GASTRIC BYPASS: ICD-10-CM

## 2021-09-28 DIAGNOSIS — D64.9 SYMPTOMATIC ANEMIA: Primary | ICD-10-CM

## 2021-09-28 LAB
EXT PREGNANCY TEST URINE: NEGATIVE
EXT. CONTROL: NORMAL
GLUCOSE SERPL-MCNC: 62 MG/DL (ref 65–140)

## 2021-09-28 PROCEDURE — 81025 URINE PREGNANCY TEST: CPT | Performed by: ANESTHESIOLOGY

## 2021-09-28 PROCEDURE — 82948 REAGENT STRIP/BLOOD GLUCOSE: CPT

## 2021-09-28 PROCEDURE — 88305 TISSUE EXAM BY PATHOLOGIST: CPT | Performed by: PATHOLOGY

## 2021-09-28 PROCEDURE — 43239 EGD BIOPSY SINGLE/MULTIPLE: CPT | Performed by: INTERNAL MEDICINE

## 2021-09-28 PROCEDURE — 45378 DIAGNOSTIC COLONOSCOPY: CPT | Performed by: INTERNAL MEDICINE

## 2021-09-28 RX ORDER — PROPOFOL 10 MG/ML
INJECTION, EMULSION INTRAVENOUS AS NEEDED
Status: DISCONTINUED | OUTPATIENT
Start: 2021-09-28 | End: 2021-09-28

## 2021-09-28 RX ORDER — SODIUM CHLORIDE 9 MG/ML
20 INJECTION, SOLUTION INTRAVENOUS ONCE
Status: CANCELLED | OUTPATIENT
Start: 2021-09-30

## 2021-09-28 RX ORDER — SODIUM CHLORIDE, SODIUM LACTATE, POTASSIUM CHLORIDE, CALCIUM CHLORIDE 600; 310; 30; 20 MG/100ML; MG/100ML; MG/100ML; MG/100ML
125 INJECTION, SOLUTION INTRAVENOUS CONTINUOUS
Status: DISCONTINUED | OUTPATIENT
Start: 2021-09-28 | End: 2021-10-02 | Stop reason: HOSPADM

## 2021-09-28 RX ADMIN — PROPOFOL 50 MG: 10 INJECTION, EMULSION INTRAVENOUS at 08:00

## 2021-09-28 RX ADMIN — PROPOFOL 50 MG: 10 INJECTION, EMULSION INTRAVENOUS at 07:51

## 2021-09-28 RX ADMIN — SODIUM CHLORIDE, SODIUM LACTATE, POTASSIUM CHLORIDE, AND CALCIUM CHLORIDE 125 ML/HR: .6; .31; .03; .02 INJECTION, SOLUTION INTRAVENOUS at 07:12

## 2021-09-28 RX ADMIN — PROPOFOL 100 MG: 10 INJECTION, EMULSION INTRAVENOUS at 07:47

## 2021-09-28 RX ADMIN — PROPOFOL 150 MG: 10 INJECTION, EMULSION INTRAVENOUS at 07:44

## 2021-09-28 RX ADMIN — PROPOFOL 50 MG: 10 INJECTION, EMULSION INTRAVENOUS at 07:57

## 2021-09-28 RX ADMIN — PROPOFOL 50 MG: 10 INJECTION, EMULSION INTRAVENOUS at 07:54

## 2021-09-28 NOTE — ANESTHESIA PREPROCEDURE EVALUATION
Procedure:  COLONOSCOPY  EGD    Relevant Problems   HEMATOLOGY   (+) Symptomatic anemia        Physical Exam    Airway    Mallampati score: II  TM Distance: >3 FB  Neck ROM: full     Dental   No notable dental hx     Cardiovascular  Rhythm: regular, Rate: normal, Cardiovascular exam normal    Pulmonary  Pulmonary exam normal Breath sounds clear to auscultation,     Other Findings        Anesthesia Plan  ASA Score- 2     Anesthesia Type- IV sedation with anesthesia with ASA Monitors  Additional Monitors:   Airway Plan:           Plan Factors-Exercise tolerance (METS): >4 METS  Chart reviewed  Patient is not a current smoker  Patient instructed to abstain from smoking on day of procedure  Induction- intravenous  Postoperative Plan-     Informed Consent- Anesthetic plan and risks discussed with patient  I personally reviewed this patient with the CRNA  Discussed and agreed on the Anesthesia Plan with the CRNA  Sandra Smith

## 2021-09-30 ENCOUNTER — HOSPITAL ENCOUNTER (OUTPATIENT)
Dept: INFUSION CENTER | Facility: CLINIC | Age: 46
Discharge: HOME | End: 2021-09-30
Payer: COMMERCIAL

## 2021-09-30 VITALS
SYSTOLIC BLOOD PRESSURE: 132 MMHG | HEART RATE: 67 BPM | RESPIRATION RATE: 16 BRPM | DIASTOLIC BLOOD PRESSURE: 69 MMHG | TEMPERATURE: 96.8 F

## 2021-09-30 DIAGNOSIS — D64.9 SYMPTOMATIC ANEMIA: Primary | ICD-10-CM

## 2021-09-30 DIAGNOSIS — Z98.84 H/O GASTRIC BYPASS: ICD-10-CM

## 2021-09-30 PROCEDURE — 96365 THER/PROPH/DIAG IV INF INIT: CPT

## 2021-09-30 RX ORDER — SODIUM CHLORIDE 9 MG/ML
20 INJECTION, SOLUTION INTRAVENOUS ONCE
Status: CANCELLED | OUTPATIENT
Start: 2021-09-30

## 2021-09-30 RX ORDER — SODIUM CHLORIDE 9 MG/ML
20 INJECTION, SOLUTION INTRAVENOUS ONCE
Status: COMPLETED | OUTPATIENT
Start: 2021-09-30 | End: 2021-09-30

## 2021-09-30 RX ADMIN — SODIUM CHLORIDE 20 ML/HR: 9 INJECTION, SOLUTION INTRAVENOUS at 08:19

## 2021-09-30 RX ADMIN — IRON SUCROSE 300 MG: 20 INJECTION, SOLUTION INTRAVENOUS at 08:19

## 2021-10-14 ENCOUNTER — TELEPHONE (OUTPATIENT)
Dept: GASTROENTEROLOGY | Facility: CLINIC | Age: 46
End: 2021-10-14

## 2022-06-07 ENCOUNTER — VBI (OUTPATIENT)
Dept: ADMINISTRATIVE | Facility: OTHER | Age: 47
End: 2022-06-07

## 2022-08-22 ENCOUNTER — TELEPHONE (OUTPATIENT)
Dept: FAMILY MEDICINE CLINIC | Facility: CLINIC | Age: 47
End: 2022-08-22

## 2022-08-22 NOTE — TELEPHONE ENCOUNTER
Called pt and left voicemail to give us a call back to schedule an appointment to see Romayne Pesa for an office visit and that's she's due for an A1C check

## 2023-02-21 ENCOUNTER — VBI (OUTPATIENT)
Dept: ADMINISTRATIVE | Facility: OTHER | Age: 48
End: 2023-02-21

## 2023-06-01 ENCOUNTER — TELEPHONE (OUTPATIENT)
Dept: FAMILY MEDICINE CLINIC | Facility: CLINIC | Age: 48
End: 2023-06-01

## 2023-08-17 ENCOUNTER — VBI (OUTPATIENT)
Dept: ADMINISTRATIVE | Facility: OTHER | Age: 48
End: 2023-08-17

## 2024-01-23 ENCOUNTER — OFFICE VISIT (OUTPATIENT)
Dept: OBGYN CLINIC | Facility: CLINIC | Age: 49
End: 2024-01-23

## 2024-01-23 VITALS
BODY MASS INDEX: 28.12 KG/M2 | DIASTOLIC BLOOD PRESSURE: 76 MMHG | HEIGHT: 66 IN | WEIGHT: 175 LBS | SYSTOLIC BLOOD PRESSURE: 122 MMHG

## 2024-01-23 DIAGNOSIS — S52.552A OTHER CLOSED EXTRA-ARTICULAR FRACTURE OF DISTAL END OF LEFT RADIUS, INITIAL ENCOUNTER: Primary | ICD-10-CM

## 2024-01-23 RX ORDER — SENNOSIDES 8.6 MG
650 CAPSULE ORAL EVERY 8 HOURS PRN
COMMUNITY

## 2024-01-23 RX ORDER — TRAMADOL HYDROCHLORIDE 50 MG/1
50 TABLET ORAL EVERY 6 HOURS PRN
COMMUNITY
Start: 2024-01-19 | End: 2025-01-18

## 2024-01-23 NOTE — PROGRESS NOTES
"Patient Name:  Rachna Jones  MRN:  [id number]    Assessment & Plan     Left distal radius fracture after mechanical fall 1/19/2024.  Radiographs performed today reveal stable alignment of the distal radius fracture in near anatomic alignment after closed reduction and splinting was performed.  Recommend patient remain in the splint at this time and continue nonweightbearing left upper extremity.  Patient requested sling for comfort due to the weight of the splint.  This was provided today.  Recommend patient remove the sling a few times a day to prevent elbow stiffness.  Encouraged to do range of motion exercises as well to help reduce digital swelling.  Continue Tylenol as needed.  Follow-up in 10 to 14 days with Dr. Howe at the [facility] for definitive management.    Chief Complaint     Left wrist injury    History of the Present Illness     Rachna Jones is a 48 y.o. female who reports to the office today for evaluation of her left wrist.  Patient sustained a mechanical fall onto her left wrist after slipping on [employer] on 1/19/2024.  She denies hitting her head or loss of consciousness.  After this occurred she noted severe pain and a deformity about the left wrist.  She did report to [facility].  At that time x-rays were performed and closed reduction and splinting was performed of the left wrist.  Currently she notes minimal pain in the left wrist.  She has been taking Tylenol with adequate relief.  She does note swelling and stiffness in the digits but denies any numbness and tingling in the hand or fingers.  No fevers or chills.    Physical Exam     Ht 5' 6\" (1.676 m)   Wt 79.4 kg (175 lb)   BMI 28.25 kg/m²     Left wrist: Splint intact.  Digital range of motion is intact and limited.  There is digital swelling.  Sensation intact median ulnar and radial nerves.  Brisk capillary refill.    Eyes: Anicteric sclerae.  ENT: Trachea midline.  Lungs: Normal respiratory effort.  CV: Capillary " refill is less than 2 seconds.  Skin: Intact without erythema.  Lymph: No palpable lymphadenopathy.  Neuro: Sensation is grossly intact to light touch.  Psych: Mood and affect are appropriate.    Data Review     I have personally reviewed pertinent films in PACS, and my interpretation follows:    X-rays left wrist 2024: Extra-articular distal radius fracture in near anatomic alignment status post closed reduction performed 2024.  No significant displacement or angulation.    Past Medical History:   Diagnosis Date    Anemia     Kidney stone        Past Surgical History:   Procedure Laterality Date     SECTION      CHOLECYSTECTOMY      EGD      GASTRIC BYPASS      HERNIA REPAIR         Allergies   Allergen Reactions    Amoxicillin GI Intolerance       Current Outpatient Medications on File Prior to Visit   Medication Sig Dispense Refill    acetaminophen (TYLENOL) 650 mg CR tablet Take 650 mg by mouth every 8 (eight) hours as needed for mild pain      [employer] Iron 240 (27 Fe) MG tablet TAKE 1 TABLET (240 MG TOTAL) BY MOUTH 3 (THREE) TIMES A DAY WITH MEALS 90 tablet 0    traMADol (ULTRAM) 50 mg tablet Take 50 mg by mouth every 6 (six) hours as needed      metFORMIN (GLUCOPHAGE) 500 mg tablet TAKE 1 TABLET BY MOUTH TWICE A DAY WITH MEALS 60 tablet 0     No current facility-administered medications on file prior to visit.       Social History     Tobacco Use    Smoking status: Never    Smokeless tobacco: Never   Vaping Use    Vaping status: Never Used   Substance Use Topics    Alcohol use: Never    Drug use: Never       Family History   Problem Relation Age of Onset    Stroke Mother     Lung cancer Mother     Aortic aneurysm Mother     Hyperlipidemia Mother     Emphysema Mother     Heart disease Father     Hypertension Father     Diabetes Father     No Known Problems Brother     No Known Problems Daughter     Asthma Maternal Grandmother     Pancreatic cancer Maternal Grandfather     Diabetes Paternal  Grandmother     Dementia Paternal Grandmother     Hypertension Paternal Grandmother     Coronary artery disease Paternal Grandfather     No Known Problems Brother        Review of Systems     As stated in the HPI. All other systems reviewed and are negative.

## 2024-01-28 DIAGNOSIS — S52.552A OTHER CLOSED EXTRA-ARTICULAR FRACTURE OF DISTAL END OF LEFT RADIUS, INITIAL ENCOUNTER: Primary | ICD-10-CM

## 2024-02-01 ENCOUNTER — OFFICE VISIT (OUTPATIENT)
Dept: OBGYN CLINIC | Facility: CLINIC | Age: 49
End: 2024-02-01
Payer: COMMERCIAL

## 2024-02-01 ENCOUNTER — APPOINTMENT (OUTPATIENT)
Dept: RADIOLOGY | Facility: CLINIC | Age: 49
End: 2024-02-01
Payer: COMMERCIAL

## 2024-02-01 ENCOUNTER — TELEPHONE (OUTPATIENT)
Dept: OBGYN CLINIC | Facility: CLINIC | Age: 49
End: 2024-02-01

## 2024-02-01 VITALS
SYSTOLIC BLOOD PRESSURE: 120 MMHG | WEIGHT: 175 LBS | BODY MASS INDEX: 28.12 KG/M2 | HEART RATE: 83 BPM | HEIGHT: 66 IN | DIASTOLIC BLOOD PRESSURE: 69 MMHG

## 2024-02-01 DIAGNOSIS — S52.552A OTHER CLOSED EXTRA-ARTICULAR FRACTURE OF DISTAL END OF LEFT RADIUS, INITIAL ENCOUNTER: ICD-10-CM

## 2024-02-01 DIAGNOSIS — S52.552A OTHER CLOSED EXTRA-ARTICULAR FRACTURE OF DISTAL END OF LEFT RADIUS, INITIAL ENCOUNTER: Primary | ICD-10-CM

## 2024-02-01 PROCEDURE — 73110 X-RAY EXAM OF WRIST: CPT

## 2024-02-01 PROCEDURE — 25600 CLTX DST RDL FX/EPHYS SEP WO: CPT | Performed by: ORTHOPAEDIC SURGERY

## 2024-02-01 PROCEDURE — 99213 OFFICE O/P EST LOW 20 MIN: CPT | Performed by: ORTHOPAEDIC SURGERY

## 2024-02-01 NOTE — PROGRESS NOTES
Orthopaedics Office Visit - New to me Patient Visit    ASSESSMENT/PLAN:    Assessment:   Left distal radius fracture, DOI 24     Plan:   The patient's x-rays were reviewed today.  The patient was placed in a short arm cast.   The patient is encouraged to take calcium and vitamin D for optimal healing  The patient may utilize Tylenol or motrin as needed for symptom management  A work note was provided stating the patient may continue to work and may not use left upper extremity.   I will see the patient back in 2 weeks      To Do Next Visit:  X-rays left wrist in cast     _____________________________________________________  CHIEF COMPLAINT:  Chief Complaint   Patient presents with    Left Wrist - Pain         SUBJECTIVE:  Rachna Jones is a 48 y.o. RHD female who presents for initial evaluation of left wrist. The patient fell onto left wrist on 24. The patient was treated in [facility] emergency room status post fall down 3 steps landing on an extended arm. The patient was treated by Girma Benavides PA-C on 24. The patient sustained a distal radius fracture.  The patient's pain is well controlled. The patient is a  and is very active. She has continued to work despite her injury and wishes to continue working.     PAST MEDICAL HISTORY:  Past Medical History:   Diagnosis Date    Anemia     Kidney stone        PAST SURGICAL HISTORY:  Past Surgical History:   Procedure Laterality Date     SECTION      CHOLECYSTECTOMY      EGD      GASTRIC BYPASS      HERNIA REPAIR         FAMILY HISTORY:  Family History   Problem Relation Age of Onset    Stroke Mother     Lung cancer Mother     Aortic aneurysm Mother     Hyperlipidemia Mother     Emphysema Mother     Heart disease Father     Hypertension Father     Diabetes Father     No Known Problems Brother     No Known Problems Daughter     Asthma Maternal Grandmother     Pancreatic cancer Maternal Grandfather     Diabetes Paternal Grandmother      "Dementia Paternal Grandmother     Hypertension Paternal Grandmother     Coronary artery disease Paternal Grandfather     No Known Problems Brother        SOCIAL HISTORY:  Social History     Tobacco Use    Smoking status: Never    Smokeless tobacco: Never   Vaping Use    Vaping status: Never Used   Substance Use Topics    Alcohol use: Never    Drug use: Never       MEDICATIONS:    Current Outpatient Medications:     acetaminophen (TYLENOL) 650 mg CR tablet, Take 650 mg by mouth every 8 (eight) hours as needed for mild pain, Disp: , Rfl:     [employer] Iron 240 (27 Fe) MG tablet, TAKE 1 TABLET (240 MG TOTAL) BY MOUTH 3 (THREE) TIMES A DAY WITH MEALS, Disp: 90 tablet, Rfl: 0    traMADol (ULTRAM) 50 mg tablet, Take 50 mg by mouth every 6 (six) hours as needed, Disp: , Rfl:     metFORMIN (GLUCOPHAGE) 500 mg tablet, TAKE 1 TABLET BY MOUTH TWICE A DAY WITH MEALS, Disp: 60 tablet, Rfl: 0    ALLERGIES:  Allergies   Allergen Reactions    Amoxicillin GI Intolerance       REVIEW OF SYSTEMS:  MSK: left wrist pain  Neuro: WNL  Pertinent items are otherwise noted in HPI.  A comprehensive review of systems was otherwise negative.    LABS:  HgA1c:   Lab Results   Component Value Date    HGBA1C 8.1 (H) 07/08/2021     BMP:   Lab Results   Component Value Date    CALCIUM 8.8 07/09/2021    K 4.0 07/09/2021    CO2 24 07/09/2021     07/09/2021    BUN 7 07/09/2021    CREATININE 0.58 (L) 07/09/2021     CBC: No components found for: \"CBC\"    _____________________________________________________  PHYSICAL EXAMINATION:  Vital signs: /69   Pulse 83   Ht 5' 6\" (1.676 m)   Wt 79.4 kg (175 lb)   BMI 28.25 kg/m²   General: No acute distress, awake and alert  Psychiatric: Mood and affect appear appropriate  HEENT: Trachea Midline, No torticollis, no apparent facial trauma  Cardiovascular: No audible murmurs; Extremities appear perfused  Pulmonary: No audible wheezing or stridor  Skin: No open lesions; see further details (if any) " below    MUSCULOSKELETAL EXAMINATION:  Extremities:    Left Wrist  Skin intact  Swelling noted over wrist  No ecchymosis   Tenderness to palpation over distal radius  Able to abduct thumb  Able to cross index and middle fingers  Able to perform OK sign   Fingers are pink and mobile  Compartments are soft  Brisk capillary refill  Sensation is intact   The patient is neurovascularly intact distally in the extremity.       _____________________________________________________  STUDIES REVIEWED:  I personally reviewed the images and interpretation is as follows:    X-rays taken 2/1/24 of left wrist demonstrate distal radius fracture in anatomic alignment with mildly displaced fragment dorsally on the lateral view.     PROCEDURES PERFORMED:  Fracture / Dislocation Treatment    Date/Time: 2/1/2024 11:15 AM    Performed by: Nolberto Howe MD  Authorized by: Nolberto Howe MD  Universal Protocol:  Consent: Verbal consent obtained.  Consent given by: patient  Patient identity confirmed: verbally with patient    Injury location:  Wrist  Location details:  Left wrist  Injury type:  Fracture  Fracture type: distal radius    Fracture type: distal radius    Neurovascular status: Neurovascularly intact    Local anesthesia used?: No    General anesthesia used?: No    Manipulation performed?: No    Immobilization:  Cast  Neurovascular status: Neurovascularly intact    Patient tolerance:  Patient tolerated the procedure well with no immediate complications        Scribe Attestation      I,:  Ronna Guzmán am acting as a scribe while in the presence of the attending physician.:       I,:  Nolberto Howe MD personally performed the services described in this documentation    as scribed in my presence.:

## 2024-02-01 NOTE — LETTER
February 1, 2024     Patient: Rachna Jones  YOB: 1975  Date of Visit: 2/1/2024      To Whom it May Concern:    Rachna Jones is under my professional care. Rachna was seen in my office on 2/1/2024. Rachna may continue to work. She is not to lift with left upper extremity.     If you have any questions or concerns, please don't hesitate to call.         Sincerely,          Nolberto Howe MD        CC: No Recipients

## 2024-02-09 ENCOUNTER — OFFICE VISIT (OUTPATIENT)
Dept: OBGYN CLINIC | Facility: CLINIC | Age: 49
End: 2024-02-09
Payer: COMMERCIAL

## 2024-02-09 ENCOUNTER — TELEPHONE (OUTPATIENT)
Dept: OBGYN CLINIC | Facility: HOSPITAL | Age: 49
End: 2024-02-09

## 2024-02-09 VITALS — BODY MASS INDEX: 28.12 KG/M2 | WEIGHT: 175 LBS | HEIGHT: 66 IN

## 2024-02-09 DIAGNOSIS — S52.552A OTHER CLOSED EXTRA-ARTICULAR FRACTURE OF DISTAL END OF LEFT RADIUS, INITIAL ENCOUNTER: Primary | ICD-10-CM

## 2024-02-09 PROCEDURE — 99024 POSTOP FOLLOW-UP VISIT: CPT | Performed by: FAMILY MEDICINE

## 2024-02-09 PROCEDURE — 29075 APPL CST ELBW FNGR SHORT ARM: CPT | Performed by: FAMILY MEDICINE

## 2024-02-09 RX ORDER — IBUPROFEN 800 MG/1
800 TABLET ORAL EVERY 6 HOURS PRN
COMMUNITY
Start: 2024-01-19 | End: 2025-01-18

## 2024-02-09 NOTE — TELEPHONE ENCOUNTER
Caller: Patient    Doctor: Shyam    Reason for call: Patient had a cast put on last Thursday and it is hurting and very tight.  Left wrist. Please advise.    Call back#: [phone]

## 2024-02-09 NOTE — PROGRESS NOTES
Assessment/Plan:  Assessment/Plan   Diagnoses and all orders for this visit:    Other closed extra-articular fracture of distal end of left radius, initial encounter  -     Cast application    Other orders  -     ibuprofen (MOTRIN) 800 mg tablet; Take 800 mg by mouth every 6 (six) hours as needed      48-year-old female who sustained fracture of left distal radius from injury on 1/19/2024.  On physical exam there is generalized swelling of the first through fourth digits.  There is normal capillary fill.  She has decreased sensation to light touch first through second digits. Cast was removed and skin inspection was unremarkable.   Based on clinical presentation and exam there was no concern for compartment syndrome.  She was replaced in short arm cast and advised to keep appointment as scheduled with Dr. Howe on 2/20/2024.          Subjective:   Patient ID: Rachna Jones is a 48 y.o. female.  Chief Complaint   Patient presents with    Left Wrist - Follow-up        48-year-old female presenting for cast check for left distal radius fracture sustained from injury on 1/19/2024.  She was last seen by orthopedics 8 days ago and placed in short arm cast.  She reports that since then feeling tightness of the cast around the area of the wrist associated with discomfort.  She also reports associated swelling in the hand and digits.  She reports feeling mild numbness and there is of the hand.  She denies intense pain.    Wrist Pain  This is a new problem. The current episode started 1 to 4 weeks ago. The problem occurs daily. The problem has been unchanged. Associated symptoms include arthralgias, joint swelling, numbness and weakness. The symptoms are aggravated by twisting. She has tried rest and immobilization for the symptoms. The treatment provided mild relief.             Review of Systems   Musculoskeletal:  Positive for arthralgias and joint swelling.   Neurological:  Positive for weakness and numbness.  "      Objective:  Vitals:    02/09/24 1305   Weight: 79.4 kg (175 lb)   Height: 5' 6\" (1.676 m)      Left Hand Exam     Other   Sensation: decreased    Comments:  Normal capillary refill            Physical Exam  Vitals and nursing note reviewed.   Constitutional:       Appearance: She is well-developed. She is not ill-appearing or diaphoretic.   HENT:      Head: Normocephalic and atraumatic.      Right Ear: External ear normal.      Left Ear: External ear normal.   Eyes:      Conjunctiva/sclera: Conjunctivae normal.   Neck:      Trachea: No tracheal deviation.   Pulmonary:      Effort: Pulmonary effort is normal. No respiratory distress.   Abdominal:      General: There is no distension.   Musculoskeletal:         General: Swelling and signs of injury present. No deformity.   Skin:     General: Skin is warm and dry.      Coloration: Skin is not jaundiced or pale.   Neurological:      Mental Status: She is alert and oriented to person, place, and time.   Psychiatric:         Mood and Affect: Mood normal.         Behavior: Behavior normal.         Thought Content: Thought content normal.         Judgment: Judgment normal.         Cast application    Date/Time: 2/9/2024 1:30 PM    Performed by: Alice Gonzales DO  Authorized by: Alice Gonzales DO  Universal Protocol:  Consent: Verbal consent obtained.  Risks and benefits: risks, benefits and alternatives were discussed  Consent given by: patient  Time out: Immediately prior to procedure a \"time out\" was called to verify the correct patient, procedure, equipment, support staff and site/side marked as required.  Patient understanding: patient states understanding of the procedure being performed  Patient consent: the patient's understanding of the procedure matches consent given  Procedure consent: procedure consent matches procedure scheduled  Relevant documents: relevant documents present and verified  Test results: test results " available and properly labeled  Site marked: the operative site was marked  Radiology Images displayed and confirmed. If images not available, report reviewed: imaging studies available  Required items: required blood products, implants, devices, and special equipment available  Patient identity confirmed: verbally with patient    Pre-procedure details:     Sensation:  Numbness  Procedure details:     Laterality:  Left    Location:  Wrist    Wrist:  L wristCast type:  Short arm        Supplies:  Cotton padding and fiberglass  Post-procedure details:     Pain:  Unchanged    Sensation:  Numbness    Patient tolerance of procedure:  Tolerated well, no immediate complications

## 2024-02-09 NOTE — TELEPHONE ENCOUNTER
Called and spoke w/pt and she saw Dr Howe on 2/1/24 Left Radial Fx and was place in a short arm cast.  Her fingers are swollen, cap refill poor, tight at wrist area and hurts; cannot get 2 fingers under top of cast.  Has been elevating above heart and has arm in sling. Was not icing. She will keep elevated and ice to try to get swelling down.    Note: Dr Howe is not in office today.  Pt had seen KIT Benavides in the past but she states too far to go to [place].  She would like to see someone today in [place].  Is there anyone in office that can help her with a too tight cast?     Please review and advise. Thank you.     [phone]

## 2024-02-17 DIAGNOSIS — S52.552A OTHER CLOSED EXTRA-ARTICULAR FRACTURE OF DISTAL END OF LEFT RADIUS, INITIAL ENCOUNTER: Primary | ICD-10-CM

## 2024-02-20 ENCOUNTER — OFFICE VISIT (OUTPATIENT)
Dept: OBGYN CLINIC | Facility: CLINIC | Age: 49
End: 2024-02-20
Payer: COMMERCIAL

## 2024-02-20 ENCOUNTER — APPOINTMENT (OUTPATIENT)
Dept: RADIOLOGY | Facility: CLINIC | Age: 49
End: 2024-02-20
Payer: COMMERCIAL

## 2024-02-20 VITALS
DIASTOLIC BLOOD PRESSURE: 79 MMHG | BODY MASS INDEX: 28.22 KG/M2 | SYSTOLIC BLOOD PRESSURE: 124 MMHG | WEIGHT: 175.6 LBS | HEART RATE: 73 BPM | HEIGHT: 66 IN

## 2024-02-20 DIAGNOSIS — S52.552A OTHER CLOSED EXTRA-ARTICULAR FRACTURE OF DISTAL END OF LEFT RADIUS, INITIAL ENCOUNTER: ICD-10-CM

## 2024-02-20 DIAGNOSIS — S52.552D OTHER CLOSED EXTRA-ARTICULAR FRACTURE OF DISTAL END OF LEFT RADIUS WITH ROUTINE HEALING, SUBSEQUENT ENCOUNTER: Primary | ICD-10-CM

## 2024-02-20 PROCEDURE — 29075 APPL CST ELBW FNGR SHORT ARM: CPT | Performed by: ORTHOPAEDIC SURGERY

## 2024-02-20 PROCEDURE — 99024 POSTOP FOLLOW-UP VISIT: CPT | Performed by: ORTHOPAEDIC SURGERY

## 2024-02-20 PROCEDURE — 73110 X-RAY EXAM OF WRIST: CPT

## 2024-02-20 NOTE — PROGRESS NOTES
Orthopaedics Office Visit - Established Patient Visit    ASSESSMENT/PLAN:    Assessment:   Left distal radius fracture, DOI 24    Fracture line persistent but pain improving after cast treatment    Plan:   X-rays were performed in the office and reviewed   Short arm cast was removed   She was placed into a new short arm cast for 2 more weeks   Continue NWB LUE   Start OT for finger/hand ROM exercises   Advised to start taking Calcium OTC, she already takes Vit. D  Follow up in 2 weeks time for cast removal and left wrist x-rays     To Do Next Visit:  Cast off, x-ray left wrist , presumed advancement of weight bearing    _____________________________________________________  CHIEF COMPLAINT:  Chief Complaint   Patient presents with    Left Wrist - Follow-up         SUBJECTIVE:  Rachna Jones is a 48 y.o. female who presents to the office for a follow up regarding a left distal radius fracture. She did present to Dr. Gonzales on 24 for a cast change as she felt her cast was too tight. She has been immobilized in a short arm cast and has tolerated this well. 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 She denies numbness and tingling but feels her fingers can get cold at times. She does take Vit. D daily.      PAST MEDICAL HISTORY:  Past Medical History:   Diagnosis Date    Anemia     Fractures     Kidney stone        PAST SURGICAL HISTORY:  Past Surgical History:   Procedure Laterality Date     SECTION      CHOLECYSTECTOMY      EGD      GASTRIC BYPASS      HERNIA REPAIR         FAMILY HISTORY:  Family History   Problem Relation Age of Onset    Stroke Mother     Lung cancer Mother     Aortic aneurysm Mother     Hyperlipidemia Mother     Emphysema Mother     Heart disease Father     Hypertension Father     Diabetes Father     No Known Problems Brother     No Known Problems Daughter     Asthma Maternal Grandmother     Pancreatic cancer Maternal Grandfather     Diabetes Paternal Grandmother     Dementia  "Paternal Grandmother     Hypertension Paternal Grandmother     Coronary artery disease Paternal Grandfather     No Known Problems Brother        SOCIAL HISTORY:  Social History     Tobacco Use    Smoking status: Never    Smokeless tobacco: Never   Vaping Use    Vaping status: Never Used   Substance Use Topics    Alcohol use: Never    Drug use: Never       MEDICATIONS:    Current Outpatient Medications:     [employer] Iron 240 (27 Fe) MG tablet, TAKE 1 TABLET (240 MG TOTAL) BY MOUTH 3 (THREE) TIMES A DAY WITH MEALS, Disp: 90 tablet, Rfl: 0    acetaminophen (TYLENOL) 650 mg CR tablet, Take 650 mg by mouth every 8 (eight) hours as needed for mild pain (Patient not taking: Reported on 2/20/2024), Disp: , Rfl:     ibuprofen (MOTRIN) 800 mg tablet, Take 800 mg by mouth every 6 (six) hours as needed, Disp: , Rfl:     traMADol (ULTRAM) 50 mg tablet, Take 50 mg by mouth every 6 (six) hours as needed (Patient not taking: Reported on 2/20/2024), Disp: , Rfl:     ALLERGIES:  Allergies   Allergen Reactions    Amoxicillin GI Intolerance       REVIEW OF SYSTEMS:  MSK: as noted in HPI  Neuro: WNL's  Pertinent items are otherwise noted in HPI.  A comprehensive review of systems was otherwise negative.    LABS:  HgA1c:   Lab Results   Component Value Date    HGBA1C 8.1 (H) 07/08/2021     BMP:   Lab Results   Component Value Date    CALCIUM 8.8 07/09/2021    K 4.0 07/09/2021    CO2 24 07/09/2021     07/09/2021    BUN 7 07/09/2021    CREATININE 0.58 (L) 07/09/2021     CBC: No components found for: \"CBC\"    _____________________________________________________  PHYSICAL EXAMINATION:  Vital signs: /79   Pulse 73   Ht 5' 6\" (1.676 m)   Wt 79.7 kg (175 lb 9.6 oz)   BMI 28.34 kg/m²   General: No acute distress, awake and alert  Psychiatric: Mood and affect appear appropriate  HEENT: Trachea Midline, No torticollis, no apparent facial trauma  Cardiovascular: No audible murmurs; Extremities appear perfused  Pulmonary: No audible " "wheezing or stridor  Skin: No open lesions; see further details (if any) below    MUSCULOSKELETAL EXAMINATION:    Extremities:  Left wrist     No erythema or ecchymosis   Mild edema  Mild tenderness over fracture site   Full finger extension   Limited flexion   Extremity appears warm and well perfused     _____________________________________________________  STUDIES REVIEWED:  I personally reviewed the images and interpretation is as follows:  X-rays of the left wrist demonstrate a healing distal radius fracture.        PROCEDURES PERFORMED:  Cast application    Date/Time: 2/20/2024 10:30 AM    Performed by: Nolberto Howe MD  Authorized by: Nolberto Howe MD  Universal Protocol:  Consent: Verbal consent obtained. Written consent not obtained.  Risks and benefits: risks, benefits and alternatives were discussed  Consent given by: patient  Time out: Immediately prior to procedure a \"time out\" was called to verify the correct patient, procedure, equipment, support staff and site/side marked as required.  Patient understanding: patient states understanding of the procedure being performed  Patient identity confirmed: verbally with patient    Pre-procedure details:     Sensation:  Normal  Procedure details:     Laterality:  Left    Location:  Wrist    Wrist:  L wrist    Strapping: no  Cast type:  Short arm        Supplies:  Cotton padding, skin protective strip, 2 layer wrap and fiberglass  Post-procedure details:     Sensation:  Normal    Patient tolerance of procedure:  Tolerated well, no immediate complications      Scribe Attestation      I,:  Jamila Chun am acting as a scribe while in the presence of the attending physician.:       I,:  Nolberto Howe MD personally performed the services described in this documentation    as scribed in my presence.:             "

## 2024-03-01 DIAGNOSIS — S52.552D OTHER CLOSED EXTRA-ARTICULAR FRACTURE OF DISTAL END OF LEFT RADIUS WITH ROUTINE HEALING, SUBSEQUENT ENCOUNTER: Primary | ICD-10-CM

## 2024-03-05 ENCOUNTER — APPOINTMENT (OUTPATIENT)
Dept: RADIOLOGY | Facility: CLINIC | Age: 49
End: 2024-03-05
Payer: COMMERCIAL

## 2024-03-05 ENCOUNTER — OFFICE VISIT (OUTPATIENT)
Dept: OBGYN CLINIC | Facility: CLINIC | Age: 49
End: 2024-03-05

## 2024-03-05 VITALS
HEART RATE: 88 BPM | HEIGHT: 66 IN | SYSTOLIC BLOOD PRESSURE: 131 MMHG | WEIGHT: 175 LBS | DIASTOLIC BLOOD PRESSURE: 84 MMHG | BODY MASS INDEX: 28.12 KG/M2

## 2024-03-05 DIAGNOSIS — S52.552A OTHER CLOSED EXTRA-ARTICULAR FRACTURE OF DISTAL END OF LEFT RADIUS, INITIAL ENCOUNTER: Primary | ICD-10-CM

## 2024-03-05 DIAGNOSIS — S52.552D OTHER CLOSED EXTRA-ARTICULAR FRACTURE OF DISTAL END OF LEFT RADIUS WITH ROUTINE HEALING, SUBSEQUENT ENCOUNTER: ICD-10-CM

## 2024-03-05 PROCEDURE — 99024 POSTOP FOLLOW-UP VISIT: CPT | Performed by: ORTHOPAEDIC SURGERY

## 2024-03-05 PROCEDURE — 73110 X-RAY EXAM OF WRIST: CPT

## 2024-03-05 NOTE — PATIENT INSTRUCTIONS
- Weight bearing as tolerated left upper extremity   - Maintain wrist brace as directed    -  Transition out of brace with physical therapy as tolerated   - Begin physical therapy as directed   - Over the counter analgesics as needed / directed   - Ice / heat as directed   - Follow up 4 weeks

## 2024-03-05 NOTE — PROGRESS NOTES
Orthopaedics Office Visit - Follow up Patient Visit    ASSESSMENT/PLAN:    Assessment:   Left distal radius fracture, DOI 24    Clinically healed   Stiffness in wrist s/p cast placement , felt cast was tight  Muscle atrophy in hand of unclear nature      Plan:   - Weight bearing as tolerated left upper extremity   - Maintain wrist brace as directed    -  Transition out of brace with physical therapy as tolerated   - Begin physical therapy as directed   - Over the counter analgesics as needed / directed   - Ice / heat as directed   - Follow up 4 weeks       To Do Next Visit:  XR left wrist   _____________________________________________________  CHIEF COMPLAINT:  Chief Complaint   Patient presents with    Left Wrist - Follow-up         SUBJECTIVE:  Rachna Jones is a 48 y.o. female who presents to the office for a follow-up evaluation of her left wrist.  Patient is 6 weeks status post injury resulting in a left distal radius fracture.  Patient states that she does continue have pain throughout her wrist but states that it is minor in nature.  Patient states that she has been maintaining her short arm cast as previously directed.  Patient states that her cast has been very very tight since the application.  Patient denies any numbness or tingling in her hands.  Patient has yet to start physical therapy at this time.  Patient offers no other complaints at this time.      PAST MEDICAL HISTORY:  Past Medical History:   Diagnosis Date    Anemia     Fractures     Kidney stone        PAST SURGICAL HISTORY:  Past Surgical History:   Procedure Laterality Date     SECTION      CHOLECYSTECTOMY      EGD      GASTRIC BYPASS      HERNIA REPAIR         FAMILY HISTORY:  Family History   Problem Relation Age of Onset    Stroke Mother     Lung cancer Mother     Aortic aneurysm Mother     Hyperlipidemia Mother     Emphysema Mother     Heart disease Father     Hypertension Father     Diabetes Father     No Known Problems  "Brother     No Known Problems Daughter     Asthma Maternal Grandmother     Pancreatic cancer Maternal Grandfather     Diabetes Paternal Grandmother     Dementia Paternal Grandmother     Hypertension Paternal Grandmother     Coronary artery disease Paternal Grandfather     No Known Problems Brother        SOCIAL HISTORY:  Social History     Tobacco Use    Smoking status: Never    Smokeless tobacco: Never   Vaping Use    Vaping status: Never Used   Substance Use Topics    Alcohol use: Never    Drug use: Never       MEDICATIONS:    Current Outpatient Medications:     CVS Iron 240 (27 Fe) MG tablet, TAKE 1 TABLET (240 MG TOTAL) BY MOUTH 3 (THREE) TIMES A DAY WITH MEALS, Disp: 90 tablet, Rfl: 0    acetaminophen (TYLENOL) 650 mg CR tablet, Take 650 mg by mouth every 8 (eight) hours as needed for mild pain (Patient not taking: Reported on 2/20/2024), Disp: , Rfl:     ibuprofen (MOTRIN) 800 mg tablet, Take 800 mg by mouth every 6 (six) hours as needed, Disp: , Rfl:     traMADol (ULTRAM) 50 mg tablet, Take 50 mg by mouth every 6 (six) hours as needed (Patient not taking: Reported on 2/20/2024), Disp: , Rfl:     ALLERGIES:  Allergies   Allergen Reactions    Amoxicillin GI Intolerance       REVIEW OF SYSTEMS:  MSK: left wrist pain   Neuro: WNL   Pertinent items are otherwise noted in HPI.  A comprehensive review of systems was otherwise negative.    LABS:  HgA1c:   Lab Results   Component Value Date    HGBA1C 8.1 (H) 07/08/2021     BMP:   Lab Results   Component Value Date    CALCIUM 8.8 07/09/2021    K 4.0 07/09/2021    CO2 24 07/09/2021     07/09/2021    BUN 7 07/09/2021    CREATININE 0.58 (L) 07/09/2021     CBC: No components found for: \"CBC\"    _____________________________________________________  PHYSICAL EXAMINATION:  Vital signs: /84   Pulse 88   Ht 5' 6\" (1.676 m)   Wt 79.4 kg (175 lb)   BMI 28.25 kg/m²   General: No acute distress, awake and alert  Psychiatric: Mood and affect appear " "appropriate  HEENT: Trachea Midline, No torticollis, no apparent facial trauma  Cardiovascular: No audible murmurs; Extremities appear perfused  Pulmonary: No audible wheezing or stridor  Skin: No open lesions; see further details (if any) below      MUSCULOSKELETAL EXAMINATION:  Left wrist examination:  - Patient sitting comfortably in the office in no apparent distress   - No acute visible abnormalities present in the left wrist. Extremity appears well-perfused overall   - Mild tenderness palpation noted over the distal radius.  No other bony or soft tissue tenderness to palpation noted at this time.  - Limited range of motion of the left wrist in all planes of motion secondary to pain  - NV intact    _____________________________________________________  STUDIES REVIEWED:  I personally reviewed the images and interpretation is as follows:  Left wrist XR 3 views:  Healing distal radius fracture in acceptable alignment with maintained alignment in comparison to previous radiograph          PROCEDURES PERFORMED:  No procedures were performed at this time.                   Andrew Pace PA-C - assisting  Nolberto Howe MD                              Portions of the record may have been created with voice recognition software.  Occasional wrong word or \"sound a like\" substitutions may have occurred due to the inherent limitations of voice recognition software.  Read the chart carefully and recognize, using context, where substitutions have occurred.    "

## 2024-03-08 ENCOUNTER — EVALUATION (OUTPATIENT)
Dept: OCCUPATIONAL THERAPY | Facility: CLINIC | Age: 49
End: 2024-03-08

## 2024-03-08 DIAGNOSIS — S52.552D OTHER CLOSED EXTRA-ARTICULAR FRACTURE OF DISTAL END OF LEFT RADIUS WITH ROUTINE HEALING, SUBSEQUENT ENCOUNTER: ICD-10-CM

## 2024-03-08 PROCEDURE — 97165 OT EVAL LOW COMPLEX 30 MIN: CPT

## 2024-03-08 NOTE — PROGRESS NOTES
OT Evaluation     Today's date: 3/8/2024  Patient name: Rachna Jones  : 1975  MRN: [id number]  Referring provider: Nolberto Howe MD  Dx:   Encounter Diagnosis     ICD-10-CM    1. Other closed extra-articular fracture of distal end of left radius with routine healing, subsequent encounter  S52.552D Ambulatory Referral to PT/OT Hand Therapy                     Assessment  Assessment details: Rachna is a 48 y.o. RHD female presenting 7 weeks after sustaining injury to the LUE from a fall down steps, resulting in a L distal radius fracture (DOI: 24). Pt's injury was managed conservatively with cast immobilization. Pt was transitioned from a cast to a removable wrist brace on 3/5/24. She has been compliant with use of her wrist brace. Pt presents with increased pain with activity, increased edema, and decreased AROM in all planes of movement in the digits, wrist and forearm. Pt is unable to make a functional fist. Functional use of the LUE is significantly limited due to these deficits. Pt is unable to use the LUE for ADLs or for normal work tasks. Pt will benefit from continued skilled OT to enable return to previous level of function. Pt was instructed in a HEP addressing edema control (isotoner glove was issued), TGE, AROM in all affected planes, and activity precautions. Pt was also instructed to start weaning brace when sleeping and during light activities if tolerable. Pt demo good understanding of HEP and instructed to stop exercises if pain increases.  Impairments: abnormal or restricted ROM, activity intolerance, impaired physical strength, lacks appropriate home exercise program and pain with function  Other impairment: moderate edema  Functional limitations: unable to use L hand for work tasks, decreased functional grasp, difficulty driving, opening doors, difficulty with ADLs (grooming, dressing, self care)  Symptom irritability: moderateBarriers to therapy: Self pay/no health  insurance  Understanding of Dx/Px/POC: excellent  Goals  STGs (to be achieved in 4 weeks):  1. Pt will be independent and compliant with HEP  2. Increase wrist AROM by 25% in affected planes  3. Increase FA rotation by 25% in affected planes  4. Pt will be able to make a functional fist  5. Pt will tolerate  strength testing     LTGs (to be achieved by discharge):  1. Pt will return to previous level of function with independent symptom management  2. Increase LUE AROM to WFL  3. Increase L  strength to equal 50% of R  4. Improve FOTO score to predicted value      Plan  Patient would benefit from: OT eval and skilled occupational therapy  Planned modality interventions: thermotherapy: hydrocollator packs  Planned therapy interventions: IASTM, joint mobilization, kinesiology taping, neuromuscular re-education, patient education, activity modification, fine motor coordination training, functional ROM exercises, graded activity, graded exercise, strengthening, stretching, therapeutic activities, therapeutic exercise and home exercise program  Other planned therapy interventions: edema management  Frequency: 1x week  Duration in weeks: 8  Plan of Care beginning date: 3/8/2024  Plan of Care expiration date: 5/8/2024  Treatment plan discussed with: patient        Subjective Evaluation    History of Present Illness  Date of onset: 1/19/2024  Mechanism of injury: trauma  Mechanism of injury: Pt slipped and fell while walking down a snowy staircase. She received emergency care at [facility] where the fracture was reduced and immobilized in a splint. Pt was seen by ortho 4 days later and due to stable alignment of fracture, conservative management was elected.          Not a recurrent problem   Quality of life: excellent    Patient Goals  Patient goals for therapy: return to work, independence with ADLs/IADLs, increased strength, increased motion, decreased pain and decreased edema  Patient goal: gain  normal use of LUE  Pain  Current pain ratin  At best pain ratin  At worst pain ratin  Location: thumb  Quality: sharp  Relieving factors: change in position  Exacerbated by: grasping objects, movement.  Progression: no change    Social Support  Lives with: daughter.    Employment status: working ()  Hand dominance: right    Treatments  No previous or current treatments  Current treatment: occupational therapy        Objective     Neurological Testing     Additional Neurological Details  Pt denies any numbness or tingling at this time    Active Range of Motion     Left Elbow   Forearm supination: 12 degrees   Forearm pronation: 50 degrees     Left Wrist   Wrist flexion: 35 degrees   Wrist extension: 30 degrees   Radial deviation: 5 degrees   Ulnar deviation: 15 degrees     Left Thumb   Flexion     MP: 28 degrees    DIP: 40 degrees  Palmar Abduction     CMC: 52 degrees    Opposition: Pt is able to oppose to tips of IF, MF, 2 cm from RF tip, and 2.5 cm from SF tip    Left Digits    Flexion   Index     MCP: 30    PIP: 78    DIP: 35  Middle     MCP: 34    PIP: 68    DIP: 40  Ring     MCP: 25    PIP: 72    DIP: 36  Little     MCP: 30    PIP: 58    DIP: 46  Extension   Index     PIP: -8  Middle     MCP: -10    PIP: -10  Ring     MCP: -10    PIP: -10  Little     PIP: -8    Swelling     Left Wrist/Hand   Thumb     Proximal: 7 cm  Index     Proximal: 7 cm  Middle     Proximal: 6.7 cm  Ring     Proximal: 6.5 cm  Little     Proximal: 5.9 cm  Circumference MCP: 20.3 cm  Circumference wrist: 17.7 cm    Right Wrist/Hand   Thumb     Proximal: 6.3 cm  Index     Proximal: 6.4 cm  Middle     Proximal: 5.9 cm  Ring     Proximal: 5.4 cm  Little     Proximal: 4.9 cm  Circumference MCP: 20.2 cm  Circumference wrist: 15.9 cm             Precautions: Universal; DOI: 24    POC expires Unit limit Auth Expiration date PT/OT/ST + Visit Limit?    N/A N/A N/A   Self Pay                         Visit/Unit Tracking  AUTH Status:  Date               N/A Used                Remaining                  Access Code: [email or web address]  URL: [email or web address]  Date: 03/08/2024  Prepared by: So Lugo    Exercises  - Seated Claw Fist AROM  - 3 x daily - 7 x weekly - 1-2 sets - 10 reps  - Finger MP Flexion AROM  - 3 x daily - 7 x weekly - 1-2 sets - 10 reps  - Seated Finger Composite Flexion Extension  - 3 x daily - 7 x weekly - 1-2 sets - 10 reps  - Combined Thumb and Finger Flexion  - 3 x daily - 7 x weekly - 1-2 sets - 10 reps  - Wrist Flexion Extension AROM  - 3 x daily - 7 x weekly - 1-2 sets - 10 reps  - Standing Forearm Pronation and Supination AROM  - 3 x daily - 7 x weekly - 1-2 sets - 10 reps  - Wrist AROM Radial Ulnar Deviation  - 3 x daily - 7 x weekly - 1-2 sets - 10 reps      Manuals 3/8            visit 1            MEM Isotoner glove            STM/IASTM NV                                      Neuro Re-Ed                                                                                                        Ther Ex             Pt educ/HEP Access Code: [email or web address]            PROM 1:1 NV            TGE 5x            AROM thumb Opp 5x            AROM wrist WE/WF 5x (open)            AROM FA S/P 5x                                                                Ther Activity                                                                              Modalities

## 2024-03-14 ENCOUNTER — OFFICE VISIT (OUTPATIENT)
Dept: OCCUPATIONAL THERAPY | Facility: CLINIC | Age: 49
End: 2024-03-14

## 2024-03-14 DIAGNOSIS — S52.552D OTHER CLOSED EXTRA-ARTICULAR FRACTURE OF DISTAL END OF LEFT RADIUS WITH ROUTINE HEALING, SUBSEQUENT ENCOUNTER: Primary | ICD-10-CM

## 2024-03-14 PROCEDURE — 97110 THERAPEUTIC EXERCISES: CPT

## 2024-03-14 NOTE — PROGRESS NOTES
"Daily Note     Today's date: 3/14/2024  Patient name: Rachna Jones  : 1975  MRN: [id number]  Referring provider: Nolberto Howe MD  Dx:   Encounter Diagnosis     ICD-10-CM    1. Other closed extra-articular fracture of distal end of left radius with routine healing, subsequent encounter  S52.552D                      Subjective: \"It feels stiff.\"      Objective: See treatment diary below      Assessment: Tolerated treatment well. Pt presents with increased AROM in all affected planes after heat and passive stretch. Patient would benefit from continued OT      Plan: Continue per plan of care.  Progress treatment as tolerated.       Precautions: Universal; DOI: 24    POC expires Unit limit Auth Expiration date PT/OT/ST + Visit Limit?    N/A N/A N/A   Self Pay                        Visit/Unit Tracking  AUTH Status:  Date               N/A Used                Remaining                  Access Code: [email or web address]  URL: [email or web address]  Date: 2024  Prepared by: So Lugo    Exercises  - Seated Claw Fist AROM  - 3 x daily - 7 x weekly - 1-2 sets - 10 reps  - Finger MP Flexion AROM  - 3 x daily - 7 x weekly - 1-2 sets - 10 reps  - Seated Finger Composite Flexion Extension  - 3 x daily - 7 x weekly - 1-2 sets - 10 reps  - Combined Thumb and Finger Flexion  - 3 x daily - 7 x weekly - 1-2 sets - 10 reps  - Wrist Flexion Extension AROM  - 3 x daily - 7 x weekly - 1-2 sets - 10 reps  - Standing Forearm Pronation and Supination AROM  - 3 x daily - 7 x weekly - 1-2 sets - 10 reps  - Wrist AROM Radial Ulnar Deviation  - 3 x daily - 7 x weekly - 1-2 sets - 10 reps      Manuals 3/8 3/14           visit 1 2           MEM Isotoner glove            STM/IASTM NV 7'           Joint mobs  5' wrist/ FA                        Neuro Re-Ed                                                                                                        Ther Ex             Pt educ/HEP Access Code: [email or web address]       "      PROM 1:1 NV 8' wrist/ FA/digits           TGE 5x            AROM thumb Opp 5x            AROM wrist WE/WF 5x (open) AA: ball roll  A: 15x           AROM FA S/P 5x A: 15x                                                               Ther Activity             Wrist maze  5x           Dexterity/ translation  Flat gems pu/pd 7/7 4x                                                  Modalities             MHP  5'

## 2024-03-22 ENCOUNTER — OFFICE VISIT (OUTPATIENT)
Dept: OCCUPATIONAL THERAPY | Facility: CLINIC | Age: 49
End: 2024-03-22

## 2024-03-22 DIAGNOSIS — S52.552D OTHER CLOSED EXTRA-ARTICULAR FRACTURE OF DISTAL END OF LEFT RADIUS WITH ROUTINE HEALING, SUBSEQUENT ENCOUNTER: Primary | ICD-10-CM

## 2024-03-22 PROCEDURE — 97110 THERAPEUTIC EXERCISES: CPT

## 2024-03-22 NOTE — PROGRESS NOTES
"Daily Note     Today's date: 3/22/2024  Patient name: Rachna Jones  : 1975  MRN: [id number]  Referring provider: Nolberto Howe MD  Dx:   Encounter Diagnosis     ICD-10-CM    1. Other closed extra-articular fracture of distal end of left radius with routine healing, subsequent encounter  S52.552D                      Subjective: \"My shoulder is sore.\"      Objective: See treatment diary below      Assessment: Tolerated treatment well. Pt presents with increased AROM in all affected planes and is able to tolerate strengthening exercises without exacerbation of pain. Also instructed pt in scapular stretches and shoulder AROM exercises. Provided pt with tensogrip sleeve for edema control while working. Also instructed pt to leave brace off in favor of function and don brace during more physical activities. Issued yellow theraputty with HEP. Patient would benefit from continued OT      Plan: Continue per plan of care.  Progress treatment as tolerated.       Precautions: Universal; DOI: 24    POC expires Unit limit Auth Expiration date PT/OT/ST + Visit Limit?    N/A N/A N/A   Self Pay                        Visit/Unit Tracking  AUTH Status:  Date               N/A Used                Remaining                  Access Code: [email or web address]  URL: [email or web address]  Date: 2024  Prepared by: So Lugo    Exercises  - Seated Claw Fist AROM  - 3 x daily - 7 x weekly - 1-2 sets - 10 reps  - Finger MP Flexion AROM  - 3 x daily - 7 x weekly - 1-2 sets - 10 reps  - Seated Finger Composite Flexion Extension  - 3 x daily - 7 x weekly - 1-2 sets - 10 reps  - Combined Thumb and Finger Flexion  - 3 x daily - 7 x weekly - 1-2 sets - 10 reps  - Wrist Flexion Extension AROM  - 3 x daily - 7 x weekly - 1-2 sets - 10 reps  - Standing Forearm Pronation and Supination AROM  - 3 x daily - 7 x weekly - 1-2 sets - 10 reps  - Wrist AROM Radial Ulnar Deviation  - 3 x daily - 7 x weekly - 1-2 sets - 10 " reps      Manuals 3/8 3/14 3/22          visit 1 2 3          MEM Isotoner glove  4'          STM/IASTM NV 7' 5'          Joint mobs  5' wrist/ FA 5' wrist/ FA                       Neuro Re-Ed                                                                                                        Ther Ex   25'          Pt educ/HEP Access Code: [email or web address]  Scap retraction, table slides, shoulder F/abd, Y putty          PROM 1:1 NV 8' wrist/ FA/digits 5' wrist/FA          TGE 5x            AROM thumb Opp 5x            AROM wrist WE/WF 5x (open) AA: ball roll  A: 15x AA: ball roll  A: 15x          AROM FA S/P 5x A: 15x A: 15x          Wrist isometrics   YFB 10/10          gripping   Y putty                                    Ther Activity             Wrist maze  5x           Dexterity/ translation  Flat gems pu/pd 7/7 4x                                                  Modalities             MHP  5'

## 2024-03-28 ENCOUNTER — APPOINTMENT (OUTPATIENT)
Dept: OCCUPATIONAL THERAPY | Facility: CLINIC | Age: 49
End: 2024-03-28

## 2024-03-31 DIAGNOSIS — S52.552A OTHER CLOSED EXTRA-ARTICULAR FRACTURE OF DISTAL END OF LEFT RADIUS, INITIAL ENCOUNTER: Primary | ICD-10-CM

## 2024-04-02 ENCOUNTER — APPOINTMENT (OUTPATIENT)
Dept: RADIOLOGY | Facility: CLINIC | Age: 49
End: 2024-04-02
Payer: COMMERCIAL

## 2024-04-02 ENCOUNTER — OFFICE VISIT (OUTPATIENT)
Dept: OBGYN CLINIC | Facility: CLINIC | Age: 49
End: 2024-04-02

## 2024-04-02 VITALS
SYSTOLIC BLOOD PRESSURE: 120 MMHG | WEIGHT: 174.6 LBS | DIASTOLIC BLOOD PRESSURE: 79 MMHG | BODY MASS INDEX: 28.06 KG/M2 | HEIGHT: 66 IN | HEART RATE: 75 BPM

## 2024-04-02 DIAGNOSIS — S52.552A OTHER CLOSED EXTRA-ARTICULAR FRACTURE OF DISTAL END OF LEFT RADIUS, INITIAL ENCOUNTER: ICD-10-CM

## 2024-04-02 DIAGNOSIS — M75.52 ACUTE BURSITIS OF LEFT SHOULDER: ICD-10-CM

## 2024-04-02 DIAGNOSIS — S52.502A CLOSED FRACTURE OF DISTAL END OF LEFT RADIUS, UNSPECIFIED FRACTURE MORPHOLOGY, INITIAL ENCOUNTER: Primary | ICD-10-CM

## 2024-04-02 DIAGNOSIS — M75.82 TENDINITIS OF LEFT ROTATOR CUFF: ICD-10-CM

## 2024-04-02 PROCEDURE — 99024 POSTOP FOLLOW-UP VISIT: CPT | Performed by: ORTHOPAEDIC SURGERY

## 2024-04-02 PROCEDURE — 73110 X-RAY EXAM OF WRIST: CPT

## 2024-04-02 RX ORDER — METHOCARBAMOL 500 MG/1
500 TABLET, FILM COATED ORAL 4 TIMES DAILY PRN
Qty: 45 TABLET | Refills: 0 | Status: SHIPPED | OUTPATIENT
Start: 2024-04-02

## 2024-04-02 NOTE — PROGRESS NOTES
Orthopaedics Office Visit - Established Patient Visit    ASSESSMENT/PLAN:    Assessment:   Left distal radius fracture, DOI 24    Clinically healed   Now able to make fist, function improving  Left shoulder rotator cuff tendinitis/bursitis  - likely overuse from favoring wrist    Plan:   The patient's x-rays were reviewed today.  The patient is to continue with occupational therapy as prescribed.  The patient is to continue with vitamin D and calcium supplementation.  The patient was provided a prescription of Robaxin for nighttime use.  The patient was referred to physical therapy to learn a home exercise plan for her left shoulder.  The patient was provided general range of motion exercises for her left shoulder while she awaits the start of physical therapy.   I will see the patient back in 4 weeks       To Do Next Visit:  Eval clinical symptoms, new XR of wrist    _____________________________________________________  CHIEF COMPLAINT:  Chief Complaint   Patient presents with    Left Wrist - Follow-up         SUBJECTIVE:  Rachna Jones is a RHD 48 y.o. female who presents approximately 10.5 weeks status post left distal radius fracture sustained on 24. The patient reports to the office for re-evaluation and updated x-rays. The patient reports she has swelling and soreness if she over does it. She continues to attend occupational therapy once per week. She is avoiding heavy lifting still. She wears her brace intermittently. She has not required anything for pain recently.  The patient also reports left shoulder pain since her injury. She has difficulty with overhead motion. She reports her occupational therapist informed her she has frozen shoulder and provided her with a home exercise plan.      PAST MEDICAL HISTORY:  Past Medical History:   Diagnosis Date    Anemia     Fractures     Kidney stone        PAST SURGICAL HISTORY:  Past Surgical History:   Procedure Laterality Date     SECTION       CHOLECYSTECTOMY      EGD      GASTRIC BYPASS      HERNIA REPAIR         FAMILY HISTORY:  Family History   Problem Relation Age of Onset    Stroke Mother     Lung cancer Mother     Aortic aneurysm Mother     Hyperlipidemia Mother     Emphysema Mother     Heart disease Father     Hypertension Father     Diabetes Father     No Known Problems Brother     No Known Problems Daughter     Asthma Maternal Grandmother     Pancreatic cancer Maternal Grandfather     Diabetes Paternal Grandmother     Dementia Paternal Grandmother     Hypertension Paternal Grandmother     Coronary artery disease Paternal Grandfather     No Known Problems Brother        SOCIAL HISTORY:  Social History     Tobacco Use    Smoking status: Never    Smokeless tobacco: Never   Vaping Use    Vaping status: Never Used   Substance Use Topics    Alcohol use: Never    Drug use: Never       MEDICATIONS:    Current Outpatient Medications:     [employer] Iron 240 (27 Fe) MG tablet, TAKE 1 TABLET (240 MG TOTAL) BY MOUTH 3 (THREE) TIMES A DAY WITH MEALS, Disp: 90 tablet, Rfl: 0    acetaminophen (TYLENOL) 650 mg CR tablet, Take 650 mg by mouth every 8 (eight) hours as needed for mild pain (Patient not taking: Reported on 2/20/2024), Disp: , Rfl:     ibuprofen (MOTRIN) 800 mg tablet, Take 800 mg by mouth every 6 (six) hours as needed, Disp: , Rfl:     traMADol (ULTRAM) 50 mg tablet, Take 50 mg by mouth every 6 (six) hours as needed (Patient not taking: Reported on 2/20/2024), Disp: , Rfl:     ALLERGIES:  Allergies   Allergen Reactions    Amoxicillin GI Intolerance       REVIEW OF SYSTEMS:  MSK: left wrist pain, left shoulder pain  Neuro: WNL  Pertinent items are otherwise noted in HPI.  A comprehensive review of systems was otherwise negative.    LABS:  HgA1c:   Lab Results   Component Value Date    HGBA1C 8.1 (H) 07/08/2021     BMP:   Lab Results   Component Value Date    CALCIUM 8.8 07/09/2021    K 4.0 07/09/2021    CO2 24 07/09/2021     07/09/2021    BUN 7  "07/09/2021    CREATININE 0.58 (L) 07/09/2021     CBC: No components found for: \"CBC\"    _____________________________________________________  PHYSICAL EXAMINATION:  Vital signs: /79   Pulse 75   Ht 5' 6\" (1.676 m)   Wt 79.2 kg (174 lb 9.6 oz)   BMI 28.18 kg/m²   General: No acute distress, awake and alert  Psychiatric: Mood and affect appear appropriate  HEENT: Trachea Midline, No torticollis, no apparent facial trauma  Cardiovascular: No audible murmurs; Extremities appear perfused  Pulmonary: No audible wheezing or stridor  Skin: No open lesions; see further details (if any) below    MUSCULOSKELETAL EXAMINATION:  Extremities:    Left Wrist  Skin intact  Mild swelling noted  No ecchymosis   No TTP over distal radius  Active range of motion without significant pain  Able to make composite fist  Fingers are pink and mobile  Compartments are soft  Brisk capillary refill  The patient is neurovascularly intact distally in the extremity.        _____________________________________________________  STUDIES REVIEWED:  I personally reviewed the images and interpretation is as follows:    X-rays taken 4/2/24 of left wrist demonstrate increased resolution of fracture line, satisfactory alignment    PROCEDURES PERFORMED:  Procedures      Scribe Attestation      I,:  Ronna Guzmán am acting as a scribe while in the presence of the attending physician.:       I,:  Nolberto Howe MD personally performed the services described in this documentation    as scribed in my presence.:             "

## 2024-04-02 NOTE — PATIENT INSTRUCTIONS
Rotator Cuff Tendinitis   WHAT YOU NEED TO KNOW:   Rotator cuff tendinitis is inflammation of the tendons in your shoulder joint. A tendon is a cord of tough tissue that connects your muscles to your bones. The rotator cuff is made up of a group of muscles and tendons that hold the shoulder joint in place.          Take your medicine as directed.  Contact your healthcare provider if you think your medicine is not helping or if you have side effects. Tell your provider if you are allergic to any medicine. Keep a list of the medicines, vitamins, and herbs you take. Include the amounts, and when and why you take them. Bring the list or the pill bottles to follow-up visits. Carry your medicine list with you in case of an emergency.    Self-care:   Rest as directed.  Limit activity on your affected shoulder to decrease stress on the tendon. This may help prevent further damage, decrease pain, and promote healing.     Apply ice on your shoulder area.  Ice helps decrease swelling and pain. Ice may also help prevent tissue damage. Use an ice pack, or put crushed ice in a plastic bag. Cover it with a towel and place it on your shoulder for 15 to 20 minutes every hour or as directed.  Physical therapy  can help you improve movement and strength, and decrease pain. A physical therapist will teach you safe exercises. The exercises may help you move your shoulder normally again and strengthen your rotator cuff. You may learn changes to make to your daily activities that will help decrease stress on your tendons.  Follow up with your doctor or orthopedist as directed:  Write down your questions so you remember to ask them during your visits.  © Copyright [employer] 2023 Information is for End User's use only and may not be sold, redistributed or otherwise used for commercial purposes.  The above information is an  only. It is not intended as medical advice for individual conditions or treatments. Talk to your doctor,  nurse or pharmacist before following any medical regimen to see if it is safe and effective for you.    Home Exercise Plan

## 2024-04-27 DIAGNOSIS — S52.502A CLOSED FRACTURE OF DISTAL END OF LEFT RADIUS, UNSPECIFIED FRACTURE MORPHOLOGY, INITIAL ENCOUNTER: Primary | ICD-10-CM

## 2024-04-30 ENCOUNTER — APPOINTMENT (OUTPATIENT)
Dept: RADIOLOGY | Facility: CLINIC | Age: 49
End: 2024-04-30
Payer: COMMERCIAL

## 2024-04-30 ENCOUNTER — OFFICE VISIT (OUTPATIENT)
Dept: OBGYN CLINIC | Facility: CLINIC | Age: 49
End: 2024-04-30

## 2024-04-30 VITALS
HEART RATE: 71 BPM | HEIGHT: 66 IN | WEIGHT: 173.4 LBS | BODY MASS INDEX: 27.87 KG/M2 | DIASTOLIC BLOOD PRESSURE: 87 MMHG | SYSTOLIC BLOOD PRESSURE: 127 MMHG

## 2024-04-30 DIAGNOSIS — M75.82 TENDINITIS OF LEFT ROTATOR CUFF: ICD-10-CM

## 2024-04-30 DIAGNOSIS — S52.502A CLOSED FRACTURE OF DISTAL END OF LEFT RADIUS, UNSPECIFIED FRACTURE MORPHOLOGY, INITIAL ENCOUNTER: ICD-10-CM

## 2024-04-30 DIAGNOSIS — M65.4 DE QUERVAIN'S DISEASE (TENOSYNOVITIS): ICD-10-CM

## 2024-04-30 DIAGNOSIS — S52.502A CLOSED FRACTURE OF DISTAL END OF LEFT RADIUS, UNSPECIFIED FRACTURE MORPHOLOGY, INITIAL ENCOUNTER: Primary | ICD-10-CM

## 2024-04-30 PROCEDURE — 73110 X-RAY EXAM OF WRIST: CPT

## 2024-04-30 PROCEDURE — 99024 POSTOP FOLLOW-UP VISIT: CPT | Performed by: ORTHOPAEDIC SURGERY

## 2024-04-30 RX ORDER — DIPHENOXYLATE HYDROCHLORIDE AND ATROPINE SULFATE 2.5; .025 MG/1; MG/1
1 TABLET ORAL DAILY
COMMUNITY

## 2024-04-30 NOTE — PROGRESS NOTES
Orthopaedics Office Visit - Follow up Patient Visit    ASSESSMENT/PLAN:    Assessment:   Left distal radius fracture, DOI 24  - healead  Left shoulder rotator cuff tendinitis/bursitis  - likely overuse from favoring wrist, now improved  Pain and ROM improving overall   De Quervain's left wrist       Plan:   Weight bearing as tolerated left upper extremity   Begin voltaren gel as directed   Discontinue cock up wrist brace at this time   Continue home exercise program   Over the counter analgesics as needed / directed   Ice / heat as directed   Follow up 3 months with repeat XR       To Do Next Visit:  XR left wrist   _____________________________________________________  CHIEF COMPLAINT:  Chief Complaint   Patient presents with    Left Wrist - Follow-up    Left Shoulder - Follow-up         SUBJECTIVE:  Rachna Jones is a 48 y.o. female who presents to the office for a follow-up evaluation of her left wrist and left shoulder.  Patient approximately  lumbar 3.5 months s/p closed treatment of the left wrist fracture.  Patient states that her wrist is doing well overall.  Patient states that she has minimal pain in the wrist currently.  Patient states that she was attending physical therapy but discontinued secondary to improvement.  Patient does note continued stiffness and weakness in the wrist.  Patient has not been taking any type of pain medication.  Patient denies any numbness or tingling in the hand.  Patient states that she does have improved range of motion and pain control in the left shoulder from previous examination.  Patient offers no other complaints at this time    PAST MEDICAL HISTORY:  Past Medical History:   Diagnosis Date    Anemia     Fractures     Kidney stone        PAST SURGICAL HISTORY:  Past Surgical History:   Procedure Laterality Date     SECTION      CHOLECYSTECTOMY      EGD      GASTRIC BYPASS      HERNIA REPAIR         FAMILY HISTORY:  Family History   Problem Relation Age of  Onset    Stroke Mother     Lung cancer Mother     Aortic aneurysm Mother     Hyperlipidemia Mother     Emphysema Mother     Heart disease Father     Hypertension Father     Diabetes Father     No Known Problems Brother     No Known Problems Daughter     Asthma Maternal Grandmother     Pancreatic cancer Maternal Grandfather     Diabetes Paternal Grandmother     Dementia Paternal Grandmother     Hypertension Paternal Grandmother     Coronary artery disease Paternal Grandfather     No Known Problems Brother        SOCIAL HISTORY:  Social History     Tobacco Use    Smoking status: Never    Smokeless tobacco: Never   Vaping Use    Vaping status: Never Used   Substance Use Topics    Alcohol use: Never    Drug use: Never       MEDICATIONS:    Current Outpatient Medications:     [employer] Iron 240 (27 Fe) MG tablet, TAKE 1 TABLET (240 MG TOTAL) BY MOUTH 3 (THREE) TIMES A DAY WITH MEALS, Disp: 90 tablet, Rfl: 0    multivitamin (THERAGRAN) TABS, Take 1 tablet by mouth daily, Disp: , Rfl:     patient supplied medication, Calcium caltrate 600-otc  1x day, Disp: , Rfl:     acetaminophen (TYLENOL) 650 mg CR tablet, Take 650 mg by mouth every 8 (eight) hours as needed for mild pain (Patient not taking: Reported on 2/20/2024), Disp: , Rfl:     methocarbamol (ROBAXIN) 500 mg tablet, Take 1 tablet (500 mg total) by mouth 4 (four) times a day as needed for muscle spasms (pain) (Patient not taking: Reported on 4/30/2024), Disp: 45 tablet, Rfl: 0    traMADol (ULTRAM) 50 mg tablet, Take 50 mg by mouth every 6 (six) hours as needed (Patient not taking: Reported on 2/20/2024), Disp: , Rfl:     ALLERGIES:  Allergies   Allergen Reactions    Amoxicillin GI Intolerance       REVIEW OF SYSTEMS:  MSK: left wrist pain   Neuro: WNL   Pertinent items are otherwise noted in HPI.  A comprehensive review of systems was otherwise negative.    LABS:  HgA1c:   Lab Results   Component Value Date    HGBA1C 8.1 (H) 07/08/2021     BMP:   Lab Results   Component  "Value Date    CALCIUM 8.8 07/09/2021    K 4.0 07/09/2021    CO2 24 07/09/2021     07/09/2021    BUN 7 07/09/2021    CREATININE 0.58 (L) 07/09/2021     CBC: No components found for: \"CBC\"    _____________________________________________________  PHYSICAL EXAMINATION:  Vital signs: /87   Pulse 71   Ht 5' 6\" (1.676 m)   Wt 78.7 kg (173 lb 6.4 oz)   BMI 27.99 kg/m²   General: No acute distress, awake and alert  Psychiatric: Mood and affect appear appropriate  HEENT: Trachea Midline, No torticollis, no apparent facial trauma  Cardiovascular: No audible murmurs; Extremities appear perfused  Pulmonary: No audible wheezing or stridor  Skin: No open lesions; see further details (if any) below      MUSCULOSKELETAL EXAMINATION:  Left wrist /  shoulder examination:  Patient sitting comfortably in the office in no apparent distress   No bony or soft tissue ttp noted in the left wrist and shoulder   Limited ROM of the wrist with extension / flexion of the wrist   Near full ROM of the shoulder without pain   Special Tests  + Finklestein's test, negative [facility] joint grind test   NV intact    _____________________________________________________  STUDIES REVIEWED:  I personally reviewed the images and interpretation is as follows:  Left wrist XR 3 VIEWS:  Healed distal radius fracture in acceptable alignment       PROCEDURES PERFORMED:  No procedures were performed at this time.                   Andrew Pace PA-C - assisting  Nolberto Howe MD                                      Portions of the record may have been created with voice recognition software.  Occasional wrong word or \"sound a like\" substitutions may have occurred due to the inherent limitations of voice recognition software.  Read the chart carefully and recognize, using context, where substitutions have occurred.    "

## 2024-04-30 NOTE — PATIENT INSTRUCTIONS
Weight bearing as tolerated left upper extremity   Begin voltaren gel as directed   Discontinue cock up wrist brace at this time   Continue home exercise program   Over the counter analgesics as needed / directed   Ice / heat as directed   Follow up 3 months with repeat XR

## 2024-08-18 DIAGNOSIS — S52.502A CLOSED FRACTURE OF DISTAL END OF LEFT RADIUS, UNSPECIFIED FRACTURE MORPHOLOGY, INITIAL ENCOUNTER: Primary | ICD-10-CM

## 2024-08-22 ENCOUNTER — APPOINTMENT (OUTPATIENT)
Dept: RADIOLOGY | Facility: CLINIC | Age: 49
End: 2024-08-22
Payer: COMMERCIAL

## 2024-08-22 ENCOUNTER — OFFICE VISIT (OUTPATIENT)
Dept: OBGYN CLINIC | Facility: CLINIC | Age: 49
End: 2024-08-22
Payer: COMMERCIAL

## 2024-08-22 VITALS
SYSTOLIC BLOOD PRESSURE: 136 MMHG | WEIGHT: 168 LBS | HEART RATE: 68 BPM | HEIGHT: 66 IN | DIASTOLIC BLOOD PRESSURE: 89 MMHG | BODY MASS INDEX: 27 KG/M2

## 2024-08-22 DIAGNOSIS — S52.502A CLOSED FRACTURE OF DISTAL END OF LEFT RADIUS, UNSPECIFIED FRACTURE MORPHOLOGY, INITIAL ENCOUNTER: ICD-10-CM

## 2024-08-22 DIAGNOSIS — S52.502A CLOSED FRACTURE OF DISTAL END OF LEFT RADIUS, UNSPECIFIED FRACTURE MORPHOLOGY, INITIAL ENCOUNTER: Primary | ICD-10-CM

## 2024-08-22 PROCEDURE — 99213 OFFICE O/P EST LOW 20 MIN: CPT | Performed by: ORTHOPAEDIC SURGERY

## 2024-08-22 PROCEDURE — 73110 X-RAY EXAM OF WRIST: CPT

## 2024-08-22 NOTE — PROGRESS NOTES
Orthopaedics Office Visit - Follow up Patient Visit    ASSESSMENT/PLAN:    Assessment:   Left distal radius fracture, DOI 24  - healed  Pain and ROM improving overall     Plan:   Healed left distal radius fracture  Weight bearing as tolerated left upper extremity   Continue home exercise program   Over the counter analgesics as needed / directed   Ice / heat as directed   Follow up PRN        To Do Next Visit:  prn  _____________________________________________________  CHIEF COMPLAINT:  Chief Complaint   Patient presents with    Left Wrist - Follow-up         SUBJECTIVE:  Rachna Jones is a 48 y.o. female who presents to the office for a follow-up evaluation of her left wrist.   She is 7 months from a Left distal radius fracture, DOI 24    Patient states that her wrist is doing well overall. Patient states that she was attending physical therapy but discontinued secondary to improvement and insurance complications.  Patient does note a sensation that her wrist needs to crack but will not do so.  Since last office visit she started Voltaren Gel for Left wrist DeQuervain's and feels this has provided significant relief to where she discontinued the use of this medication. Patient has not been taking any type of pain medication.  Patient denies any numbness or tingling in the hand.   PAST MEDICAL HISTORY:  Past Medical History:   Diagnosis Date    Anemia     Fractures     Kidney stone        PAST SURGICAL HISTORY:  Past Surgical History:   Procedure Laterality Date     SECTION      CHOLECYSTECTOMY      EGD      GASTRIC BYPASS      HERNIA REPAIR         FAMILY HISTORY:  Family History   Problem Relation Age of Onset    Stroke Mother     Lung cancer Mother     Aortic aneurysm Mother     Hyperlipidemia Mother     Emphysema Mother     Heart disease Father     Hypertension Father     Diabetes Father     No Known Problems Brother     No Known Problems Daughter     Asthma Maternal Grandmother     Pancreatic  cancer Maternal Grandfather     Diabetes Paternal Grandmother     Dementia Paternal Grandmother     Hypertension Paternal Grandmother     Coronary artery disease Paternal Grandfather     No Known Problems Brother        SOCIAL HISTORY:  Social History     Tobacco Use    Smoking status: Never    Smokeless tobacco: Never   Vaping Use    Vaping status: Never Used   Substance Use Topics    Alcohol use: Never    Drug use: Never       MEDICATIONS:    Current Outpatient Medications:     [employer] Iron 240 (27 Fe) MG tablet, TAKE 1 TABLET (240 MG TOTAL) BY MOUTH 3 (THREE) TIMES A DAY WITH MEALS, Disp: 90 tablet, Rfl: 0    multivitamin (THERAGRAN) TABS, Take 1 tablet by mouth daily, Disp: , Rfl:     patient supplied medication, Calcium caltrate 600-otc  1x day, Disp: , Rfl:     acetaminophen (TYLENOL) 650 mg CR tablet, Take 650 mg by mouth every 8 (eight) hours as needed for mild pain (Patient not taking: Reported on 2/20/2024), Disp: , Rfl:     Diclofenac Sodium (VOLTAREN) 1 %, Apply 2 g topically 4 (four) times a day (Patient not taking: Reported on 8/22/2024), Disp: 150 g, Rfl: 1    methocarbamol (ROBAXIN) 500 mg tablet, Take 1 tablet (500 mg total) by mouth 4 (four) times a day as needed for muscle spasms (pain) (Patient not taking: Reported on 4/30/2024), Disp: 45 tablet, Rfl: 0    traMADol (ULTRAM) 50 mg tablet, Take 50 mg by mouth every 6 (six) hours as needed (Patient not taking: Reported on 2/20/2024), Disp: , Rfl:     ALLERGIES:  Allergies   Allergen Reactions    Amoxicillin GI Intolerance       REVIEW OF SYSTEMS:  MSK: left wrist pain   Neuro: WNL   Pertinent items are otherwise noted in HPI.  A comprehensive review of systems was otherwise negative.    LABS:  HgA1c:   Lab Results   Component Value Date    HGBA1C 8.1 (H) 07/08/2021     BMP:   Lab Results   Component Value Date    CALCIUM 8.8 07/09/2021    K 4.0 07/09/2021    CO2 24 07/09/2021     07/09/2021    BUN 7 07/09/2021    CREATININE 0.58 (L) 07/09/2021  "    CBC: No components found for: \"CBC\"    _____________________________________________________  PHYSICAL EXAMINATION:  Vital signs: /89   Pulse 68   Ht 5' 6\" (1.676 m)   Wt 76.2 kg (168 lb)   BMI 27.12 kg/m²   General: No acute distress, awake and alert  Psychiatric: Mood and affect appear appropriate  HEENT: Trachea Midline, No torticollis, no apparent facial trauma  Cardiovascular: No audible murmurs; Extremities appear perfused  Pulmonary: No audible wheezing or stridor  Skin: No open lesions; see further details (if any) below      MUSCULOSKELETAL EXAMINATION:  Left wrist /  shoulder examination:  Patient sitting comfortably in the office in no apparent distress   No bony or soft tissue ttp noted in the left wrist   Full ROM of the wrist with extension / flexion   NV intact    _____________________________________________________  STUDIES REVIEWED:  I personally reviewed the images and interpretation is as follows:  Left wrist XR 3 VIEWS 8/22/24  Healed distal radius fracture in acceptable alignment       PROCEDURES PERFORMED:  No procedures were performed at this time.     Scribe Attestation      I,:  Smiley Campbell am acting as a scribe while in the presence of the attending physician.:       I,:  Nolberto Howe MD personally performed the services described in this documentation    as scribed in my presence.:                                  "

## 2025-06-02 ENCOUNTER — RESULTS FOLLOW-UP (OUTPATIENT)
Age: 50
End: 2025-06-02

## 2025-06-02 ENCOUNTER — OFFICE VISIT (OUTPATIENT)
Age: 50
End: 2025-06-02
Payer: COMMERCIAL

## 2025-06-02 VITALS
DIASTOLIC BLOOD PRESSURE: 86 MMHG | OXYGEN SATURATION: 99 % | WEIGHT: 180 LBS | HEART RATE: 83 BPM | RESPIRATION RATE: 16 BRPM | TEMPERATURE: 97.7 F | BODY MASS INDEX: 28.25 KG/M2 | SYSTOLIC BLOOD PRESSURE: 150 MMHG | HEIGHT: 67 IN

## 2025-06-02 DIAGNOSIS — Z00.00 ANNUAL PHYSICAL EXAM: Primary | ICD-10-CM

## 2025-06-02 DIAGNOSIS — E11.9 TYPE 2 DIABETES MELLITUS WITHOUT COMPLICATION, WITHOUT LONG-TERM CURRENT USE OF INSULIN (HCC): ICD-10-CM

## 2025-06-02 DIAGNOSIS — Z11.4 SCREENING FOR HIV (HUMAN IMMUNODEFICIENCY VIRUS): ICD-10-CM

## 2025-06-02 DIAGNOSIS — Z12.31 ENCOUNTER FOR SCREENING MAMMOGRAM FOR BREAST CANCER: ICD-10-CM

## 2025-06-02 DIAGNOSIS — Z01.419 ENCOUNTER FOR GYNECOLOGICAL EXAMINATION: ICD-10-CM

## 2025-06-02 DIAGNOSIS — D50.9 IRON DEFICIENCY ANEMIA, UNSPECIFIED IRON DEFICIENCY ANEMIA TYPE: Primary | ICD-10-CM

## 2025-06-02 DIAGNOSIS — Z13.220 ENCOUNTER FOR LIPID SCREENING FOR CARDIOVASCULAR DISEASE: ICD-10-CM

## 2025-06-02 DIAGNOSIS — G43.919 INTRACTABLE MIGRAINE WITHOUT STATUS MIGRAINOSUS, UNSPECIFIED MIGRAINE TYPE: ICD-10-CM

## 2025-06-02 DIAGNOSIS — Z13.6 ENCOUNTER FOR LIPID SCREENING FOR CARDIOVASCULAR DISEASE: ICD-10-CM

## 2025-06-02 DIAGNOSIS — Z76.89 ENCOUNTER TO ESTABLISH CARE: ICD-10-CM

## 2025-06-02 DIAGNOSIS — D64.9 SYMPTOMATIC ANEMIA: ICD-10-CM

## 2025-06-02 DIAGNOSIS — I83.813 VARICOSE VEINS OF BOTH LOWER EXTREMITIES WITH PAIN: ICD-10-CM

## 2025-06-02 LAB
LEFT EYE DIABETIC RETINOPATHY: ABNORMAL
LEFT EYE IMAGE QUALITY: ABNORMAL
LEFT EYE MACULAR EDEMA: ABNORMAL
LEFT EYE OTHER RETINOPATHY: ABNORMAL
RIGHT EYE DIABETIC RETINOPATHY: ABNORMAL
RIGHT EYE IMAGE QUALITY: ABNORMAL
RIGHT EYE MACULAR EDEMA: ABNORMAL
RIGHT EYE OTHER RETINOPATHY: ABNORMAL
SEVERITY (EYE EXAM): ABNORMAL
SL AMB POCT HEMOGLOBIN AIC: 5.4 (ref ?–6.5)

## 2025-06-02 PROCEDURE — 99214 OFFICE O/P EST MOD 30 MIN: CPT

## 2025-06-02 PROCEDURE — 83036 HEMOGLOBIN GLYCOSYLATED A1C: CPT

## 2025-06-02 PROCEDURE — 99386 PREV VISIT NEW AGE 40-64: CPT

## 2025-06-02 RX ORDER — TOPIRAMATE 25 MG/1
75 TABLET, FILM COATED ORAL 2 TIMES DAILY
COMMUNITY

## 2025-06-02 RX ORDER — BUPROPION HYDROCHLORIDE 150 MG/1
150 TABLET, EXTENDED RELEASE ORAL 2 TIMES DAILY
COMMUNITY

## 2025-06-02 RX ORDER — SUMATRIPTAN SUCCINATE 25 MG/1
25 TABLET ORAL ONCE AS NEEDED
Qty: 30 TABLET | Refills: 0 | Status: SHIPPED | OUTPATIENT
Start: 2025-06-02

## 2025-06-02 NOTE — ASSESSMENT & PLAN NOTE
Recent ER visit for thrombophlebitis reviewed.  Vascular venous duplex reviewed and no DVT, but there is evidence of a superficial thrombus in the right thigh.  Due to patient's complaint of pain from her varicosities, I recommend following up with vascular surgery.  Referral placed.  Orders:  •  Ambulatory Referral to Vascular Surgery; Future

## 2025-06-02 NOTE — ASSESSMENT & PLAN NOTE
Patient recently had CBC done during the hospital which showed mild anemia.  Uncertain etiology since the patient is no longer having regular monthly periods. I recommend following up with an iron panel to see if she is iron deficient and also checking a Cologuard stool test to look for any fecal occult blood as a cause of her anemia.  Continue daily over-the-counter iron supplement.    Orders:  •  Cologuard  •  Iron Panel (Includes Ferritin, Iron Sat%, Iron, and TIBC)

## 2025-06-02 NOTE — ASSESSMENT & PLAN NOTE
Lab Results   Component Value Date    HGBA1C 5.4 06/02/2025   Controlled without medication.  Recommend getting diabetic eye exam and an albumin/creatinine ratio for yearly screening.  Diabetic foot exam performed in office today.  Recommend diet low in carbs and sugars.  Will continue to monitor.    Orders:  •  POCT hemoglobin A1c  •  Albumin / creatinine urine ratio; Future  •  IRIS Diabetic eye exam

## 2025-06-02 NOTE — PROGRESS NOTES
Adult Annual Physical  Name: Rachna Jones      : 1975      MRN: [id number]  Encounter Provider: Kari Gonzáles PA-C  Encounter Date: 2025   Encounter department: [facility]    :  Assessment & Plan  Annual physical exam  - Discussed healthy diet, lifestyle, and screening recommendations with the patient. Appropriate orders placed.   - recent lab results reviewed and/or lab orders placed as appropriate for monitoring of the patient's chronic conditions or evaluation of acute complaints  - Discussed recommended vaccinations including second dose of zoster vaccine, pneumonia vaccine, Tdap booster.  Instructed patient to go to her local pharmacy to get the second dose of zoster vaccination.  Patient declines pneumonia and Tdap vaccination today.       Encounter to establish care         Intractable migraine without status migrainosus, unspecified migraine type  Symptoms most consistent with migraine headache.  Recommend trial of sumatriptan.  She may take a second dose 2 hours after the first as needed for headache reduction.  She may take Tylenol in addition as needed for pain relief.  Avoid NSAIDs like ibuprofen, Motrin, Advil, naproxen, Aleve due to history of gastric bypass surgery.  Follow-up if symptoms worsen or fail to improve.  Discussed that if seeing some improvement, but not enough, we can increase the dose of sumatriptan.  Orders:  •  SUMAtriptan (Imitrex) 25 mg tablet; Take 1 tablet (25 mg total) by mouth once as needed for migraine for up to 1 dose A second dose may be taken after 2 hours if needed    Varicose veins of both lower extremities with pain  Recent ER visit for thrombophlebitis reviewed.  Vascular venous duplex reviewed and no DVT, but there is evidence of a superficial thrombus in the right thigh.  Due to patient's complaint of pain from her varicosities, I recommend following up with vascular surgery.  Referral placed.  Orders:  •  Ambulatory Referral to  Vascular Surgery; Future    Type 2 diabetes mellitus without complication, without long-term current use of insulin (HCC)    Lab Results   Component Value Date    HGBA1C 5.4 06/02/2025   Controlled without medication.  Recommend getting diabetic eye exam and an albumin/creatinine ratio for yearly screening.  Diabetic foot exam performed in office today.  Recommend diet low in carbs and sugars.  Will continue to monitor.    Orders:  •  POCT hemoglobin A1c  •  Albumin / creatinine urine ratio; Future  •  IRIS Diabetic eye exam    Symptomatic anemia  Patient recently had CBC done during the hospital which showed mild anemia.  Uncertain etiology since the patient is no longer having regular monthly periods. I recommend following up with an iron panel to see if she is iron deficient and also checking a Cologuard stool test to look for any fecal occult blood as a cause of her anemia.  Continue daily over-the-counter iron supplement.    Orders:  •  Cologuard  •  Iron Panel (Includes Ferritin, Iron Sat%, Iron, and TIBC)    Encounter for lipid screening for cardiovascular disease  Discussed recommended screening with the patient and appropriate order placed.    Orders:  •  Lipid Panel with Direct LDL reflex    Encounter for gynecological examination  Patient agreeable to gynecology referral for Pap smear  Orders:  •  Ambulatory referral to Obstetrics / Gynecology; Future    Encounter for screening mammogram for breast cancer  Patient agreeable to complete mammogram  Orders:  •  Mammo screening bilateral w 3d and cad; Future    Screening for HIV (human immunodeficiency virus)  Discussed recommended screening but patient declines           Preventive Screenings:  - Diabetes Screening: screening not indicated and has diabetes  - Cholesterol Screening: risks/benefits discussed and orders placed   - Hepatitis C screening: screening up-to-date   - HIV screening: risks/benefits discussed and patient declines   - Cervical cancer  screening: risks/benefits discussed and orders placed   - Breast cancer screening: risks/benefits discussed and orders placed   - Colon cancer screening: screening up-to-date   - Lung cancer screening: screening not indicated   - Osteoporosis screening: risks/benefits discussed and pateint declines     Patient would like to wait to order osteoporosis screening until a future visit     Immunizations:  - Immunizations due: Prevnar 20, Tdap and Zoster (Shingrix)      Depression Screening and Follow-up Plan: Patient was screened for depression during today's encounter. They screened negative with a PHQ-2 score of 0.          History of Present Illness     Adult Annual Physical:  Patient presents for annual physical. Rachna presents to the office to establish care, for annual physical, and with complaint of a right sided migraine for the past week.  She has had migraines about once a month over the years, but they used to improve with use of Excedrin or Tylenol.  Over the past week her migraine headache does not seem to responding to Excedrin or Tylenol as well.  Last weekend she worked a long weekend and she is also admits to not drinking much water throughout the day, so she feels she is dehydrated as well, which may be contributing to her headache.  The headache is described as a throbbing pain of the right side of her head.  She has tried Excedrin and Tylenol with some improvement in symptoms.  She denies sensitivity to light, but does have sensitivity to sound and scents.  She has had sensitivity to light in the past with her migraines though.  She has a history of anemia.  She states that she used to have heavy menstrual periods, but recently she has been skipping her.  For several months at a time before getting it again.  She had a colonoscopy done a few years ago which was normal.  She states her anemia was so severe in the past that she has needed transfusions.  She has not needed transfusions for several years  "(last in approximately 2019 or 2020?)  She recently went to the ER for leg pain and was concerned about a DVT.  No DVT, but they did find a superficial thrombus of the right thigh.  She has significant varicosities of her lower extremities which cause discomfort as well.  .     Diet and Physical Activity:  - Diet/Nutrition: no special diet.  - Exercise: walking.    Depression Screening:  - PHQ-2 Score: 0    General Health:  - Sleep: sleeps well.  - Hearing: normal hearing bilateral ears.  - Vision: no vision problems.  - Dental: brushes teeth twice daily.    /GYN Health:  - Follows with GYN: no.   - Menopause: perimenopausal.     Review of Systems   Constitutional:  Negative for chills and fever.   HENT:  Negative for congestion, ear pain and sore throat.    Eyes:  Negative for pain and visual disturbance.   Respiratory:  Negative for cough and shortness of breath.    Cardiovascular:  Negative for chest pain and palpitations.   Gastrointestinal:  Negative for abdominal pain, blood in stool, constipation, diarrhea and vomiting.   Genitourinary:  Negative for dysuria and hematuria.   Musculoskeletal:  Negative for arthralgias, back pain and myalgias.   Skin:  Negative for color change and rash.   Neurological:  Positive for headaches. Negative for dizziness, seizures, syncope and light-headedness.   All other systems reviewed and are negative.        Objective   /86 (BP Location: Right arm, Patient Position: Sitting, Cuff Size: Standard)   Pulse 83   Temp 97.7 °F (36.5 °C) (Tympanic)   Resp 16   Ht 5' 6.5\" (1.689 m)   Wt 81.6 kg (180 lb)   SpO2 99%   BMI 28.62 kg/m²     Physical Exam  Vitals and nursing note reviewed.   Constitutional:       General: She is not in acute distress.     Appearance: She is well-developed.   HENT:      Head: Normocephalic and atraumatic.      Right Ear: Tympanic membrane normal. There is no impacted cerumen.      Left Ear: Tympanic membrane normal. There is no impacted " cerumen.      Nose: Nose normal.      Mouth/Throat:      Mouth: Mucous membranes are moist.      Pharynx: Oropharynx is clear. No oropharyngeal exudate.     Eyes:      Extraocular Movements: Extraocular movements intact.      Conjunctiva/sclera: Conjunctivae normal.      Pupils: Pupils are equal, round, and reactive to light.       Cardiovascular:      Rate and Rhythm: Normal rate and regular rhythm.      Pulses: no weak pulses.           Dorsalis pedis pulses are 2+ on the right side and 2+ on the left side.        Posterior tibial pulses are 2+ on the right side and 2+ on the left side.      Heart sounds: Normal heart sounds. No murmur heard.     No friction rub. No gallop.   Pulmonary:      Effort: Pulmonary effort is normal. No respiratory distress.      Breath sounds: Normal breath sounds. No wheezing, rhonchi or rales.   Abdominal:      Palpations: Abdomen is soft.      Tenderness: There is no abdominal tenderness.     Musculoskeletal:         General: No swelling.      Cervical back: Neck supple.      Comments: Varicosities present in the bilateral lower extremities.  Appears more severe in the right leg.  Varicosity of the right inner thigh tender to palpation.   Feet:      Right foot:      Skin integrity: No ulcer, skin breakdown, erythema, warmth, callus or dry skin.      Left foot:      Skin integrity: No ulcer, skin breakdown, erythema, warmth, callus or dry skin.     Skin:     General: Skin is warm and dry.      Capillary Refill: Capillary refill takes less than 2 seconds.     Neurological:      General: No focal deficit present.      Mental Status: She is alert.     Psychiatric:         Mood and Affect: Mood normal.     Patient's shoes and socks removed.    Right Foot/Ankle   Right Foot Inspection  Skin Exam: skin normal and skin intact. No dry skin, no warmth, no callus, no erythema, no maceration, no abnormal color, no pre-ulcer, no ulcer and no callus.     Toe Exam: ROM and strength within normal  limits.     Sensory   Monofilament testing: intact    Vascular  The right DP pulse is 2+. The right PT pulse is 2+.     Left Foot/Ankle  Left Foot Inspection  Skin Exam: skin normal and skin intact. No dry skin, no warmth, no erythema, no maceration, normal color, no pre-ulcer, no ulcer and no callus.     Toe Exam: ROM and strength within normal limits.     Sensory   Monofilament testing: intact    Vascular  The left DP pulse is 2+. The left PT pulse is 2+.     Assign Risk Category  No deformity present  No loss of protective sensation  No weak pulses  Risk: 0

## 2025-06-02 NOTE — PROGRESS NOTES
Diabetic Foot Exam    Patient's shoes and socks removed.    Right Foot/Ankle   Right Foot Inspection  Skin Exam: skin normal, skin intact, callus and callus. No dry skin, no warmth, no erythema, no maceration, no abnormal color, no pre-ulcer and no ulcer.     Sensory   Vibration: intact  Proprioception: intact  Monofilament testing: intact    Left Foot/Ankle  Left Foot Inspection  Skin Exam: skin normal, skin intact and callus. No dry skin, no warmth, no erythema, no maceration, normal color, no pre-ulcer and no ulcer.     Sensory   Vibration: intact  Proprioception: intact  Monofilament testing: intact    Assign Risk Category  {Diabetic Foot Exam Documentation Incomplete}

## 2025-06-02 NOTE — TELEPHONE ENCOUNTER
Pt called back regarding missed call. Gave permission to read verbatim. Pt states she will call an eye dr to follow up with.

## 2025-06-06 ENCOUNTER — TELEPHONE (OUTPATIENT)
Age: 50
End: 2025-06-06

## 2025-06-06 NOTE — TELEPHONE ENCOUNTER
Referral [facility]cd for obgyn.     2nd phone attempt- Called pt, unable to schedule at time of call; advised they will c/b later.    1st attempt documented in referral- made on 6/4

## 2025-06-12 ENCOUNTER — APPOINTMENT (OUTPATIENT)
Age: 50
End: 2025-06-12
Payer: COMMERCIAL

## 2025-06-12 DIAGNOSIS — E11.9 TYPE 2 DIABETES MELLITUS WITHOUT COMPLICATION, WITHOUT LONG-TERM CURRENT USE OF INSULIN (HCC): ICD-10-CM

## 2025-06-12 LAB
CHOLEST SERPL-MCNC: 167 MG/DL (ref ?–200)
CREAT UR-MCNC: 139.1 MG/DL
FERRITIN SERPL-MCNC: 5 NG/ML (ref 30–307)
HDLC SERPL-MCNC: 65 MG/DL
IRON SATN MFR SERPL: 8 % (ref 15–50)
IRON SERPL-MCNC: 38 UG/DL (ref 50–212)
LDLC SERPL CALC-MCNC: 92 MG/DL (ref 0–100)
MICROALBUMIN UR-MCNC: 10 MG/L
MICROALBUMIN/CREAT 24H UR: 7 MG/G CREATININE (ref 0–30)
TIBC SERPL-MCNC: 491.4 UG/DL (ref 250–450)
TRANSFERRIN SERPL-MCNC: 351 MG/DL (ref 203–362)
TRIGL SERPL-MCNC: 49 MG/DL (ref ?–150)
UIBC SERPL-MCNC: 453 UG/DL (ref 155–355)

## 2025-06-12 PROCEDURE — 82728 ASSAY OF FERRITIN: CPT

## 2025-06-12 PROCEDURE — 83540 ASSAY OF IRON: CPT

## 2025-06-12 PROCEDURE — 82570 ASSAY OF URINE CREATININE: CPT

## 2025-06-12 PROCEDURE — 36415 COLL VENOUS BLD VENIPUNCTURE: CPT

## 2025-06-12 PROCEDURE — 80061 LIPID PANEL: CPT

## 2025-06-12 PROCEDURE — 82043 UR ALBUMIN QUANTITATIVE: CPT

## 2025-06-12 PROCEDURE — 83550 IRON BINDING TEST: CPT

## 2025-06-13 PROBLEM — D50.9 IRON DEFICIENCY ANEMIA: Status: ACTIVE | Noted: 2025-06-13

## 2025-06-13 RX ORDER — FERROUS SULFATE 324(65)MG
324 TABLET, DELAYED RELEASE (ENTERIC COATED) ORAL EVERY OTHER DAY
Qty: 30 TABLET | Refills: 1 | Status: SHIPPED | OUTPATIENT
Start: 2025-06-13

## 2025-06-13 NOTE — TELEPHONE ENCOUNTER
----- Message from Kari Gonzáles PA-C sent at 6/13/2025  2:04 PM EDT -----  Lab results show the patient has iron deficiency.  I have sent in a prescription for an iron pill for the patient to take once every other day.  I would recommend she take this instead of her current   over-the-counter iron supplement, as the prescription likely has a higher dose of elemental iron than what she is currently taking.  I would recommend rechecking her iron counts and blood levels in 3   months for monitoring.  Also encourage the patient to get the Cologuard testing done as ordered to look for any blood in the stool the cause of her iron deficiency anemia.  ----- Message -----  From: Lab, Background User  Sent: 6/12/2025   5:12 PM EDT  To: Kari Gonzáles PA-C

## 2025-06-16 ENCOUNTER — TELEPHONE (OUTPATIENT)
Age: 50
End: 2025-06-16

## 2025-06-16 DIAGNOSIS — Z12.11 COLON CANCER SCREENING: Primary | ICD-10-CM

## 2025-06-16 NOTE — TELEPHONE ENCOUNTER
Received a call from Leandro with [employer] regarding a recent cologuard order they received.     Leandro states that the Dx code they received with the order does not match with the type of testing requested. (Symptomatic anemia [D64.9] ) This Dx code is not considered appropriate for colo rectal screening.     Codes must be either Z12.11 or Z12.12    Please re-fax new order with appropriate Dx code to [phone]     Please indicate reference code [id number]  And Order Number [id number]      A verbal order can also be given by calling [phone]

## 2025-06-17 ENCOUNTER — OFFICE VISIT (OUTPATIENT)
Age: 50
End: 2025-06-17
Payer: COMMERCIAL

## 2025-06-17 VITALS
TEMPERATURE: 96.9 F | OXYGEN SATURATION: 100 % | WEIGHT: 179 LBS | RESPIRATION RATE: 18 BRPM | HEART RATE: 77 BPM | SYSTOLIC BLOOD PRESSURE: 133 MMHG | DIASTOLIC BLOOD PRESSURE: 77 MMHG | BODY MASS INDEX: 28.46 KG/M2

## 2025-06-17 DIAGNOSIS — B02.9 HERPES ZOSTER WITHOUT COMPLICATION: Primary | ICD-10-CM

## 2025-06-17 PROCEDURE — 99203 OFFICE O/P NEW LOW 30 MIN: CPT | Performed by: PHYSICIAN ASSISTANT

## 2025-06-17 RX ORDER — VALACYCLOVIR HYDROCHLORIDE 1 G/1
1000 TABLET, FILM COATED ORAL 3 TIMES DAILY
Qty: 21 TABLET | Refills: 0 | Status: SHIPPED | OUTPATIENT
Start: 2025-06-17 | End: 2025-06-24

## 2025-06-17 NOTE — PROGRESS NOTES
[facility]        NAME: Rachna Jones is a 49 y.o. female  : 1975    MRN: [id number]  DATE: 2025  TIME: 9:36 AM    Assessment and Plan   Herpes zoster without complication [B02.9]  1. Herpes zoster without complication  valACYclovir (VALTREX) 1,000 mg tablet          Follow-up with PCP if no improvement    The patient and/or parent/legal guardian verbalized understanding of exam findings and   Treatment plan. We engaged in the shared decision-making process and treatment options were   discussed at length with the patient.  All questions, concerns and complaints were answered and   addressed to the patient's' and/or parent/legal guardians's satisfaction.    Patient Instructions   There are no Patient Instructions on file for this visit.    Follow up with PCP in 3-5 days.  Proceed to  ER if symptoms worsen.    If tests are performed, our office will contact you with results only if   changes need to made to the care plan discussed with you at the visit.   [facility]     Chief Complaint     Chief Complaint   Patient presents with   • Rash     Rash on right side of her face started 3 days ago.         History of Present Illness       HPI  Patient reports a rash on the right side of her face that began 3 days ago that feels identical to prior herpes zoster she has had in this area face twice before. Has itching and burning . Had 1 of 2 shingles vaccines.     Review of Systems   Review of Systems  All other related systems reviewed with patient or accompanying historian and are negative except as noted in HPI    Current Medications     Current Medications[1]    Current Allergies     Allergies as of 2025 - Reviewed 2025   Allergen Reaction Noted   • Amoxicillin GI Intolerance 2021            The following portions of the patient's history were reviewed and updated as appropriate: allergies, current medications, past family history,  "past medical history, past social history, past surgical history and problem list.     Past Medical History[2]    Past Surgical History[3]    Family History[4]      Medications have been verified.        Objective   /77 (BP Location: Right arm)   Pulse 77   Temp (!) 96.9 °F (36.1 °C)   Resp 18   Wt 81.2 kg (179 lb)   SpO2 100%   BMI 28.46 kg/m²   No LMP recorded.       Physical Exam     Physical Exam  Constitutional:       General: She is not in acute distress.     Appearance: She is well-developed.   HENT:      Head: Normocephalic and atraumatic.     Eyes:      General: No scleral icterus.     Conjunctiva/sclera: Conjunctivae normal.     Neck:      Trachea: No tracheal deviation.   Pulmonary:      Effort: Pulmonary effort is normal. No respiratory distress.      Breath sounds: No stridor.     Musculoskeletal:      Cervical back: Normal range of motion.     Skin:     General: Skin is warm and dry.      Findings: Rash present. No erythema.      Comments: 2 small herpetic lesions in the preauricular region, there are no lesions in the ear canal     Neurological:      Mental Status: She is alert and oriented to person, place, and time.     Psychiatric:         Behavior: Behavior normal.         Ortho Exam        Procedures  No Procedures performed today        Note: Portions of this record may have been created with voice recognition software. Occasional wrong word or \"sound a like\" substitutions may have occurred due to the inherent limitations of voice recognition software. Please read the chart carefully and recognize, using context, where substitutions have occurred.*             [1]    Current Outpatient Medications:   •  valACYclovir (VALTREX) 1,000 mg tablet, Take 1 tablet (1,000 mg total) by mouth 3 (three) times a day for 7 days, Disp: 21 tablet, Rfl: 0  •  buPROPion (WELLBUTRIN SR) 150 mg 12 hr tablet, Take 150 mg by mouth 2 (two) times a day, Disp: , Rfl:   •  ferrous sulfate 324 (65 Fe) mg, Take " 1 tablet (324 mg total) by mouth every other day, Disp: 30 tablet, Rfl: 1  •  multivitamin (THERAGRAN) TABS, Take 1 tablet by mouth in the morning., Disp: , Rfl:   •  patient supplied medication, Calcium caltrate 600-otc  1x day, Disp: , Rfl:   •  SUMAtriptan (Imitrex) 25 mg tablet, Take 1 tablet (25 mg total) by mouth once as needed for migraine for up to 1 dose A second dose may be taken after 2 hours if needed, Disp: 30 tablet, Rfl: 0  •  topiramate (TOPAMAX) 25 mg tablet, Take 75 mg by mouth 2 (two) times a day, Disp: , Rfl: [2]  Past Medical History:  Diagnosis Date   • Anemia    • Fractures    • Kidney stone    [3]  Past Surgical History:  Procedure Laterality Date   •  SECTION     • CHOLECYSTECTOMY     • EGD     • GASTRIC BYPASS     • HERNIA REPAIR     [4]  Family History  Problem Relation Name Age of Onset   • Stroke Mother     • Lung cancer Mother     • Aortic aneurysm Mother     • Hyperlipidemia Mother     • Emphysema Mother     • Heart disease Father     • Hypertension Father     • Diabetes Father     • No Known Problems Brother     • No Known Problems Daughter     • Asthma Maternal Grandmother     • Pancreatic cancer Maternal Grandfather     • Diabetes Paternal Grandmother     • Dementia Paternal Grandmother     • Hypertension Paternal Grandmother     • Coronary artery disease Paternal Grandfather     • No Known Problems Brother